# Patient Record
Sex: FEMALE | Race: WHITE | NOT HISPANIC OR LATINO | Employment: OTHER | URBAN - METROPOLITAN AREA
[De-identification: names, ages, dates, MRNs, and addresses within clinical notes are randomized per-mention and may not be internally consistent; named-entity substitution may affect disease eponyms.]

---

## 2019-06-09 PROBLEM — J18.9 PNEUMONIA OF RIGHT LOWER LOBE DUE TO INFECTIOUS ORGANISM: Status: ACTIVE | Noted: 2019-06-09

## 2019-06-09 PROBLEM — J18.9 PNEUMONIA: Status: ACTIVE | Noted: 2019-06-09

## 2019-06-13 PROBLEM — I27.21 PULMONARY ARTERIAL HYPERTENSION: Status: ACTIVE | Noted: 2019-06-13

## 2019-06-13 PROBLEM — I50.32 CHRONIC DIASTOLIC HEART FAILURE: Status: ACTIVE | Noted: 2019-06-13

## 2019-10-02 DIAGNOSIS — I10 ESSENTIAL HYPERTENSION, BENIGN: Primary | ICD-10-CM

## 2019-10-02 RX ORDER — LISINOPRIL 20 MG/1
20 TABLET ORAL DAILY
Qty: 90 TABLET | Refills: 1 | Status: SHIPPED | OUTPATIENT
Start: 2019-10-02 | End: 2019-11-25 | Stop reason: SDUPTHER

## 2019-10-02 RX ORDER — AMLODIPINE BESYLATE 5 MG/1
5 TABLET ORAL DAILY
Qty: 90 TABLET | Refills: 1 | Status: SHIPPED | OUTPATIENT
Start: 2019-10-02 | End: 2019-11-25 | Stop reason: SDUPTHER

## 2019-10-02 NOTE — TELEPHONE ENCOUNTER
----- Message from Ivette Pitt sent at 10/2/2019 10:05 AM CDT -----  Contact: Fatimah  Refill lisinopril and  Amlodipine. [facility]. pts # [phone]

## 2019-11-04 DIAGNOSIS — I10 ESSENTIAL HYPERTENSION, BENIGN: Primary | ICD-10-CM

## 2019-11-04 RX ORDER — METOPROLOL TARTRATE 25 MG/1
25 TABLET, FILM COATED ORAL 2 TIMES DAILY
Qty: 180 TABLET | Refills: 1 | Status: SHIPPED | OUTPATIENT
Start: 2019-11-04 | End: 2019-11-25 | Stop reason: SDUPTHER

## 2019-11-04 NOTE — TELEPHONE ENCOUNTER
----- Message from Margarita Escalante sent at 11/4/2019 10:11 AM CST -----  Pt needs a refill for Metoprolol Tartrate 25 mg. [facility].

## 2019-11-11 ENCOUNTER — TELEPHONE (OUTPATIENT)
Dept: FAMILY MEDICINE | Facility: CLINIC | Age: 84
End: 2019-11-11

## 2019-11-11 DIAGNOSIS — E78.5 HYPERLIPIDEMIA, UNSPECIFIED HYPERLIPIDEMIA TYPE: ICD-10-CM

## 2019-11-11 DIAGNOSIS — Z12.11 SPECIAL SCREENING FOR MALIGNANT NEOPLASMS, COLON: ICD-10-CM

## 2019-11-11 DIAGNOSIS — Z79.899 ENCOUNTER FOR LONG-TERM (CURRENT) USE OF OTHER MEDICATIONS: Primary | ICD-10-CM

## 2019-11-24 PROBLEM — D51.0 PERNICIOUS ANEMIA: Status: ACTIVE | Noted: 2019-11-24

## 2019-11-25 ENCOUNTER — OFFICE VISIT (OUTPATIENT)
Dept: FAMILY MEDICINE | Facility: CLINIC | Age: 84
End: 2019-11-25
Payer: MEDICARE

## 2019-11-25 VITALS
BODY MASS INDEX: 31.1 KG/M2 | DIASTOLIC BLOOD PRESSURE: 76 MMHG | HEIGHT: 62 IN | HEART RATE: 76 BPM | SYSTOLIC BLOOD PRESSURE: 124 MMHG | WEIGHT: 169 LBS

## 2019-11-25 DIAGNOSIS — I10 ESSENTIAL HYPERTENSION: Chronic | ICD-10-CM

## 2019-11-25 DIAGNOSIS — E78.2 MIXED HYPERLIPIDEMIA: Chronic | ICD-10-CM

## 2019-11-25 DIAGNOSIS — M15.9 PRIMARY OSTEOARTHRITIS INVOLVING MULTIPLE JOINTS: Chronic | ICD-10-CM

## 2019-11-25 DIAGNOSIS — I48.0 PAROXYSMAL ATRIAL FIBRILLATION: Primary | Chronic | ICD-10-CM

## 2019-11-25 DIAGNOSIS — I50.32 CHRONIC DIASTOLIC HEART FAILURE: ICD-10-CM

## 2019-11-25 DIAGNOSIS — M81.0 AGE-RELATED OSTEOPOROSIS WITHOUT CURRENT PATHOLOGICAL FRACTURE: ICD-10-CM

## 2019-11-25 PROBLEM — J18.9 PNEUMONIA OF RIGHT LOWER LOBE DUE TO INFECTIOUS ORGANISM: Status: RESOLVED | Noted: 2019-06-09 | Resolved: 2019-11-25

## 2019-11-25 PROBLEM — J18.9 PNEUMONIA: Status: RESOLVED | Noted: 2019-06-09 | Resolved: 2019-11-25

## 2019-11-25 PROCEDURE — 99214 OFFICE O/P EST MOD 30 MIN: CPT | Mod: S$GLB,,, | Performed by: FAMILY MEDICINE

## 2019-11-25 PROCEDURE — 99214 PR OFFICE/OUTPT VISIT, EST, LEVL IV, 30-39 MIN: ICD-10-PCS | Mod: S$GLB,,, | Performed by: FAMILY MEDICINE

## 2019-11-25 PROCEDURE — 1159F MED LIST DOCD IN RCRD: CPT | Mod: S$GLB,,, | Performed by: FAMILY MEDICINE

## 2019-11-25 PROCEDURE — 1159F PR MEDICATION LIST DOCUMENTED IN MEDICAL RECORD: ICD-10-PCS | Mod: S$GLB,,, | Performed by: FAMILY MEDICINE

## 2019-11-25 RX ORDER — SIMVASTATIN 20 MG/1
20 TABLET, FILM COATED ORAL NIGHTLY
Qty: 90 TABLET | Refills: 1 | Status: SHIPPED | OUTPATIENT
Start: 2019-11-25 | End: 2020-06-01 | Stop reason: SDUPTHER

## 2019-11-25 RX ORDER — LATANOPROST 50 UG/ML
1 SOLUTION/ DROPS OPHTHALMIC DAILY
COMMUNITY
Start: 2019-10-26

## 2019-11-25 RX ORDER — APIXABAN 5 MG/1
1 TABLET, FILM COATED ORAL 2 TIMES DAILY
COMMUNITY
Start: 2019-09-02 | End: 2020-12-14 | Stop reason: SDUPTHER

## 2019-11-25 RX ORDER — CYCLOSPORINE 0.5 MG/ML
1 EMULSION OPHTHALMIC 2 TIMES DAILY
COMMUNITY
Start: 2019-10-22

## 2019-11-25 RX ORDER — LISINOPRIL 20 MG/1
20 TABLET ORAL DAILY
Qty: 90 TABLET | Refills: 1 | Status: SHIPPED | OUTPATIENT
Start: 2019-11-25 | End: 2020-06-01 | Stop reason: SDUPTHER

## 2019-11-25 RX ORDER — IBANDRONATE SODIUM 150 MG/1
150 TABLET, FILM COATED ORAL
Qty: 3 TABLET | Refills: 1 | Status: CANCELLED | OUTPATIENT
Start: 2019-11-25

## 2019-11-25 RX ORDER — METOPROLOL TARTRATE 25 MG/1
25 TABLET, FILM COATED ORAL 2 TIMES DAILY
Qty: 180 TABLET | Refills: 1 | Status: SHIPPED | OUTPATIENT
Start: 2019-11-25 | End: 2020-06-01

## 2019-11-25 RX ORDER — AMLODIPINE BESYLATE 5 MG/1
5 TABLET ORAL DAILY
Qty: 90 TABLET | Refills: 1 | Status: SHIPPED | OUTPATIENT
Start: 2019-11-25 | End: 2020-06-01 | Stop reason: SDUPTHER

## 2019-11-25 NOTE — PROGRESS NOTES
SUBJECTIVE:    Patient ID: Fatimah Garber is a 89 y.o. female.    Chief Complaint: Follow-up (bottles brought -ac )    89 yr old female presents for routine exam. She is doing well and denies any new health concerns.   Lives alone. Does laundry and cooking, family has someone come in to clean for her. She does not drive but is active in several organizations as well as spends time going out to eat with friends.       Admission on 06/09/2019, Discharged on 06/13/2019   Component Date Value Ref Range Status    WBC 06/09/2019 7.34  3.90 - 12.70 K/uL Final    RBC 06/09/2019 3.85* 4.00 - 5.40 M/uL Final    Hemoglobin 06/09/2019 11.8* 12.0 - 16.0 g/dL Final    Hematocrit 06/09/2019 34.9* 37.0 - 48.5 % Final    Mean Corpuscular Volume 06/09/2019 91  82 - 98 fL Final    Mean Corpuscular Hemoglobin 06/09/2019 30.6  27.0 - 31.0 pg Final    Mean Corpuscular Hemoglobin Conc 06/09/2019 33.8  32.0 - 36.0 g/dL Final    RDW 06/09/2019 16.3* 11.5 - 14.5 % Final    Platelets 06/09/2019 149* 150 - 350 K/uL Final    MPV 06/09/2019 12.1  9.2 - 12.9 fL Final    Immature Granulocytes 06/09/2019 0.1  0.0 - 0.5 % Final    Gran # (ANC) 06/09/2019 5.5  1.8 - 7.7 K/uL Final    Immature Grans (Abs) 06/09/2019 0.01  0.00 - 0.04 K/uL Final    Lymph # 06/09/2019 0.8* 1.0 - 4.8 K/uL Final    Mono # 06/09/2019 0.8  0.3 - 1.0 K/uL Final    Eos # 06/09/2019 0.2  0.0 - 0.5 K/uL Final    Baso # 06/09/2019 0.05  0.00 - 0.20 K/uL Final    nRBC 06/09/2019 0  0 /100 WBC Final    Gran% 06/09/2019 74.3* 38.0 - 73.0 % Final    Lymph% 06/09/2019 10.5* 18.0 - 48.0 % Final    Mono% 06/09/2019 11.3  4.0 - 15.0 % Final    Eosinophil% 06/09/2019 3.1  0.0 - 8.0 % Final    Basophil% 06/09/2019 0.7  0.0 - 1.9 % Final    Differential Method 06/09/2019 Automated   Final    Sodium 06/09/2019 136  136 - 145 mmol/L Final    Potassium 06/09/2019 3.6  3.5 - 5.1 mmol/L Final    Chloride 06/09/2019 101  95 - 110 mmol/L Final    CO2 06/09/2019 23  22 -  31 mmol/L Final    Glucose 06/09/2019 109  70 - 110 mg/dL Final    BUN, Bld 06/09/2019 17  7 - 18 mg/dL Final    Creatinine 06/09/2019 0.66  0.50 - 1.40 mg/dL Final    Calcium 06/09/2019 9.1  8.4 - 10.2 mg/dL Final    Total Protein 06/09/2019 7.3  6.0 - 8.4 g/dL Final    Albumin 06/09/2019 4.3  3.5 - 5.2 g/dL Final    Total Bilirubin 06/09/2019 1.0  0.2 - 1.3 mg/dL Final    Alkaline Phosphatase 06/09/2019 57  38 - 145 U/L Final    AST 06/09/2019 26  14 - 36 U/L Final    ALT 06/09/2019 26  10 - 44 U/L Final    Anion Gap 06/09/2019 12  8 - 16 mmol/L Final    eGFR if African American 06/09/2019 >60  >60 mL/min/1.73 m^2 Final    eGFR if non African American 06/09/2019 >60  >60 mL/min/1.73 m^2 Final    Troponin I 06/09/2019 <0.012  0.012 - 0.034 ng/mL Final    NT-proBNP 06/09/2019 1600  5 - 1800 pg/mL Final    Blood Culture, Routine 06/09/2019 No growth after 5 days.   Final    Blood Culture, Routine 06/09/2019 No growth after 5 days.   Final    LVIDS 06/09/2019 2.78  2.1 - 4.0 cm Final    Ascending aorta 06/09/2019 2.6  cm Final    STJ 06/09/2019 2.49  cm Final    AV mean gradient 06/09/2019 12  mmHg Final    Ao peak geoffrey 06/09/2019 2.27  m/s Final    Ao VTI 06/09/2019 40.0  cm Final    IVS 06/09/2019 1.33* 0.6 - 1.1 cm Final    LA size 06/09/2019 4.6  cm Final    LVIDD 06/09/2019 4.10  3.5 - 6.0 cm Final    LVOT diameter 06/09/2019 2.0  cm Final    LVOT peak VTI 06/09/2019 15.7  cm Final    PW 06/09/2019 1.26* 0.6 - 1.1 cm Final    E wave decelartion time 06/09/2019 171  msec Final    RA Major Axis 06/09/2019 5.76  cm Final    RA Width 06/09/2019 3.33  cm Final    TR Max Geoffrey 06/09/2019 3.39  m/s Final    RVDD 06/09/2019 2.94  cm Final    LA WIDTH 06/09/2019 4.23  cm Final    PV PEAK VELOCITY 06/09/2019 122  cm/s Final    LVOT peak geoffrey 06/09/2019 97.3  m/s Final    BSA 06/09/2019 1.9  m2 Final    TDI SEPTAL 06/09/2019 0.08   Final    LV LATERAL E/E' RATIO 06/09/2019 13.44   Final     LV SEPTAL E/E' RATIO 06/09/2019 15.13   Final    TDI LATERAL 06/09/2019 0.09   Final    FS 06/09/2019 32  28 - 44 % Final    LV mass 06/09/2019 192.37  g Final    Left Ventricle Relative Wall Thick* 06/09/2019 0.61  cm Final    AV valve area 06/09/2019 1.23  cm2 Final    AV Velocity Ratio 06/09/2019 42.86   Final    AV index (prosthetic) 06/09/2019 0.39   Final    Mean e' 06/09/2019 0.09   Final    LVOT area 06/09/2019 3.14  cm2 Final    LVOT stroke volume 06/09/2019 49.30  cm3 Final    AV peak gradient 06/09/2019 20.61  mmHg Final    E/E' ratio 06/09/2019 14.24   Final    MV Peak E Geoffrey 06/09/2019 1.21  m/s Final    LV Mass Index 06/09/2019 103.4  g/m2 Final    Triscuspid Valve Regurgitation Pea* 06/09/2019 45.97  mmHg Final    LA volume 06/09/2019 106.87  cm3 Final    LA Volume Index 06/09/2019 57.5  mL/m2 Final    Left Atrium Minor Axis 06/09/2019 6.62  cm Final    Left Atrium Major Axis 06/09/2019 6.31  cm Final    MV valve area p 1/2 method 06/09/2019 3.93  cm2 Final    MV stenosis pressure 1/2 time 06/09/2019 56  ms Final    Right Atrial Pressure (from IVC) 06/09/2019 3  mmHg Final    TV rest pulmonary artery pressure 06/09/2019 49  mmHg Final    Procalcitonin 06/09/2019 <0.05  <0.25 ng/mL Final    WBC 06/10/2019 9.12  3.90 - 12.70 K/uL Final    RBC 06/10/2019 3.67* 4.00 - 5.40 M/uL Final    Hemoglobin 06/10/2019 10.9* 12.0 - 16.0 g/dL Final    Hematocrit 06/10/2019 32.7* 37.0 - 48.5 % Final    Mean Corpuscular Volume 06/10/2019 89  82 - 98 fL Final    Mean Corpuscular Hemoglobin 06/10/2019 29.7  27.0 - 31.0 pg Final    Mean Corpuscular Hemoglobin Conc 06/10/2019 33.3  32.0 - 36.0 g/dL Final    RDW 06/10/2019 15.9* 11.5 - 14.5 % Final    Platelets 06/10/2019 162  150 - 350 K/uL Final    MPV 06/10/2019 12.3  9.2 - 12.9 fL Final    Immature Granulocytes 06/10/2019 0.2  0.0 - 0.5 % Final    Gran # (ANC) 06/10/2019 8.1* 1.8 - 7.7 K/uL Final    Immature Grans (Abs) 06/10/2019  0.02  0.00 - 0.04 K/uL Final    Lymph # 06/10/2019 0.7* 1.0 - 4.8 K/uL Final    Mono # 06/10/2019 0.4  0.3 - 1.0 K/uL Final    Eos # 06/10/2019 0.0  0.0 - 0.5 K/uL Final    Baso # 06/10/2019 0.01  0.00 - 0.20 K/uL Final    nRBC 06/10/2019 0  0 /100 WBC Final    Gran% 06/10/2019 88.7* 38.0 - 73.0 % Final    Lymph% 06/10/2019 7.2* 18.0 - 48.0 % Final    Mono% 06/10/2019 3.8* 4.0 - 15.0 % Final    Eosinophil% 06/10/2019 0.0  0.0 - 8.0 % Final    Basophil% 06/10/2019 0.1  0.0 - 1.9 % Final    Differential Method 06/10/2019 Automated   Final    Sodium 06/10/2019 134* 136 - 145 mmol/L Final    Potassium 06/10/2019 3.8  3.5 - 5.1 mmol/L Final    Chloride 06/10/2019 101  95 - 110 mmol/L Final    CO2 06/10/2019 25  22 - 31 mmol/L Final    Glucose 06/10/2019 132* 70 - 110 mg/dL Final    BUN, Bld 06/10/2019 19* 7 - 18 mg/dL Final    Creatinine 06/10/2019 0.62  0.50 - 1.40 mg/dL Final    Calcium 06/10/2019 8.7  8.4 - 10.2 mg/dL Final    Anion Gap 06/10/2019 8  8 - 16 mmol/L Final    eGFR if African American 06/10/2019 >60  >60 mL/min/1.73 m^2 Final    eGFR if non African American 06/10/2019 >60  >60 mL/min/1.73 m^2 Final       Past Medical History:   Diagnosis Date    Atrial fibrillation     Hyperlipidemia     Hypertension     Osteoarthritis      Past Surgical History:   Procedure Laterality Date    HYSTERECTOMY       Family History   Problem Relation Age of Onset    Hypertension Mother     Kidney disease Mother     Heart disease Father        Marital Status:   Alcohol History:  reports that she drinks about 1.0 standard drinks of alcohol per week.  Tobacco History:  reports that she has never smoked. She has never used smokeless tobacco.  Drug History:  reports that she does not use drugs.    Review of patient's allergies indicates:  No Known Allergies    Current Outpatient Medications:     ELIQUIS 5 mg Tab, Take 1 tablet by mouth 2 (two) times daily., Disp: , Rfl:     ibandronate  (BONIVA) 150 mg tablet, Take 150 mg by mouth every 30 days., Disp: , Rfl:     lisinopril (PRINIVIL,ZESTRIL) 20 MG tablet, Take 1 tablet (20 mg total) by mouth once daily., Disp: 90 tablet, Rfl: 1    metoprolol tartrate (LOPRESSOR) 25 MG tablet, Take 1 tablet (25 mg total) by mouth 2 (two) times daily., Disp: 180 tablet, Rfl: 1    amLODIPine (NORVASC) 5 MG tablet, Take 1 tablet (5 mg total) by mouth once daily., Disp: 90 tablet, Rfl: 1    calcium carbonate/vitamin D3 (CALCIUM 600 + D,3, ORAL), Take 1 tablet by mouth once daily., Disp: , Rfl:     ipratropium-albuterol (COMBIVENT)  mcg/actuation inhaler, Inhale 1 puff into the lungs every 6 (six) hours as needed for Wheezing or Shortness of Breath. Rescue, Disp: 4 g, Rfl: 1    latanoprost 0.005 % ophthalmic solution, , Disp: , Rfl:     multivitamin-min-FA-ginkgo (ONE DAILY WOMEN 50 PLUS) 400-120 mcg-mg Tab, as directed, Disp: , Rfl:     RESTASIS 0.05 % ophthalmic emulsion, , Disp: , Rfl:     simvastatin (ZOCOR) 20 MG tablet, Take 20 mg by mouth every evening., Disp: , Rfl:     vitamin B complex (B COMPLEX-VITAMIN B12 ORAL), Take 1,000 mcg by mouth once daily., Disp: , Rfl:     vitamins  A,C,E-zinc-copper (PRESERVISION AREDS) 7,160-113-100 unit-mg-unit Tab, , Disp: , Rfl:     Review of Systems   Constitutional: Negative for appetite change, chills, fatigue, fever and unexpected weight change.   HENT: Negative for congestion, ear pain, sinus pain, sore throat and trouble swallowing.    Eyes: Negative for pain, discharge and visual disturbance.   Respiratory: Negative for apnea, cough, shortness of breath and wheezing.    Cardiovascular: Positive for leg swelling (foot and ankle swelling caroline. unable do wear compression socks. No difficulty wearing shoes.). Negative for chest pain and palpitations.   Gastrointestinal: Negative for abdominal pain, blood in stool, constipation, diarrhea, nausea and vomiting.   Endocrine: Negative for heat intolerance,  "polydipsia and polyuria.   Genitourinary: Negative for difficulty urinating, dyspareunia, dysuria, frequency, hematuria and menstrual problem.   Musculoskeletal: Positive for back pain (occasional low back pain when standing for long periods). Negative for arthralgias, gait problem, joint swelling and myalgias.   Allergic/Immunologic: Negative for environmental allergies, food allergies and immunocompromised state.   Neurological: Negative for dizziness, tremors, seizures, numbness and headaches.   Psychiatric/Behavioral: Negative for behavioral problems, confusion, hallucinations and suicidal ideas. The patient is not nervous/anxious.           Objective:      Vitals:    11/25/19 1018   BP: 124/76   Pulse: 76   Weight: 76.7 kg (169 lb)   Height: 5' 2" (1.575 m)     Body mass index is 30.91 kg/m².  Physical Exam   Constitutional: She is oriented to person, place, and time. She appears well-developed and well-nourished.   HENT:   Head: Normocephalic and atraumatic.   Right Ear: External ear normal.   Left Ear: External ear normal.   Nose: Nose normal.   Mouth/Throat: Oropharynx is clear and moist.   Eyes: Pupils are equal, round, and reactive to light. EOM are normal.   Neck: Normal range of motion. Neck supple. Carotid bruit is not present. No thyromegaly present.   Cardiovascular: Normal rate, regular rhythm, normal heart sounds and intact distal pulses.   No murmur heard.  Pulmonary/Chest: Effort normal and breath sounds normal. She has no wheezes. She has no rales.   Abdominal: Soft. Bowel sounds are normal. She exhibits no distension. There is no hepatosplenomegaly. There is no tenderness.   Musculoskeletal: Normal range of motion. She exhibits edema (1+ edema in caroline ankles and feet). She exhibits no tenderness or deformity.        Lumbar back: Normal. She exhibits no pain and no spasm.   Slow gait   Lymphadenopathy:     She has no cervical adenopathy.   Neurological: She is alert and oriented to person, place, " and time. No cranial nerve deficit. Coordination normal.   Skin: Skin is warm and dry. No rash noted.   Psychiatric: She has a normal mood and affect. Her behavior is normal. Judgment and thought content normal.   Nursing note and vitals reviewed.        Assessment:       1. Paroxysmal atrial fibrillation    2. Essential hypertension    3. Mixed hyperlipidemia    4. Primary osteoarthritis involving multiple joints    5. Age-related osteoporosis without current pathological fracture    6. Chronic diastolic heart failure         Plan:       Paroxysmal atrial fibrillation  - Stable at this time. Continue to Eliquis 5mg daily.  - Managed per Dr. Meza.    Essential hypertension  - Blood pressure at goal. Continue lisinopril 20mg daily and amlodipine 5mg daily.    Mixed hyperlipidemia  - Lipid panel to be obtained today. Continue simvastatin 20mg daily.    Primary osteoarthritis involving multiple joints  - Minimal pain complaints at this time.    Age-related osteoporosis without current pathological fracture  - Has been on ibandronate for several years now. Recommend drug holiday after she finishes her last pill in February.    Chronic diastolic heart failure  - Stable at this time. Managed per Dr. Meza.    Follow up in about 6 months (around 5/25/2020) for check up with labs.

## 2019-11-26 LAB
ALBUMIN SERPL-MCNC: 4.3 G/DL (ref 3.6–5.1)
ALBUMIN/GLOB SERPL: 1.7 (CALC) (ref 1–2.5)
ALP SERPL-CCNC: 51 U/L (ref 33–130)
ALT SERPL-CCNC: 28 U/L (ref 6–29)
AST SERPL-CCNC: 31 U/L (ref 10–35)
BILIRUB SERPL-MCNC: 0.8 MG/DL (ref 0.2–1.2)
BUN SERPL-MCNC: 19 MG/DL (ref 7–25)
BUN/CREAT SERPL: NORMAL (CALC) (ref 6–22)
CALCIUM SERPL-MCNC: 9.8 MG/DL (ref 8.6–10.4)
CHLORIDE SERPL-SCNC: 102 MMOL/L (ref 98–110)
CHOLEST SERPL-MCNC: 154 MG/DL
CHOLEST/HDLC SERPL: 2.9 (CALC)
CO2 SERPL-SCNC: 27 MMOL/L (ref 20–32)
CREAT SERPL-MCNC: 0.67 MG/DL (ref 0.6–0.88)
GFRSERPLBLD MDRD-ARVRAT: 78 ML/MIN/1.73M2
GLOBULIN SER CALC-MCNC: 2.6 G/DL (CALC) (ref 1.9–3.7)
GLUCOSE SERPL-MCNC: 92 MG/DL (ref 65–139)
HDLC SERPL-MCNC: 53 MG/DL
LDLC SERPL CALC-MCNC: 85 MG/DL (CALC)
NONHDLC SERPL-MCNC: 101 MG/DL (CALC)
POTASSIUM SERPL-SCNC: 4.3 MMOL/L (ref 3.5–5.3)
PROT SERPL-MCNC: 6.9 G/DL (ref 6.1–8.1)
SODIUM SERPL-SCNC: 138 MMOL/L (ref 135–146)
TRIGL SERPL-MCNC: 72 MG/DL

## 2019-12-10 NOTE — TELEPHONE ENCOUNTER
Please call pt and let her know labs are stable. Her blood sugar was a little high at 132 but not sure if this was a fasting lab. Kidney function is normal and cholesterol levels are well controlled- continue current medication.

## 2019-12-11 ENCOUNTER — TELEPHONE (OUTPATIENT)
Dept: FAMILY MEDICINE | Facility: CLINIC | Age: 84
End: 2019-12-11

## 2019-12-11 NOTE — TELEPHONE ENCOUNTER
Spoke to patient's daughter, Luz, because the patient was at a luncheon and could not hear me speaking to her. She will relay the message.

## 2019-12-11 NOTE — TELEPHONE ENCOUNTER
----- Message from Margarita Walls NP sent at 12/9/2019  9:21 PM CST -----  Please call pt and let her know labs are stable. Her blood sugar was a little high at 132 but not sure if this was a fasting lab. Kidney function is normal and cholesterol levels are well controlled- continue current medication.

## 2020-05-19 ENCOUNTER — TELEPHONE (OUTPATIENT)
Dept: FAMILY MEDICINE | Facility: CLINIC | Age: 85
End: 2020-05-19

## 2020-05-22 ENCOUNTER — TELEPHONE (OUTPATIENT)
Dept: FAMILY MEDICINE | Facility: CLINIC | Age: 85
End: 2020-05-22

## 2020-05-27 LAB
ALBUMIN SERPL-MCNC: 4.1 G/DL (ref 3.6–5.1)
ALBUMIN/GLOB SERPL: 1.5 (CALC) (ref 1–2.5)
ALP SERPL-CCNC: 49 U/L (ref 37–153)
ALT SERPL-CCNC: 24 U/L (ref 6–29)
AST SERPL-CCNC: 20 U/L (ref 10–35)
BASOPHILS # BLD AUTO: 59 CELLS/UL (ref 0–200)
BASOPHILS NFR BLD AUTO: 0.9 %
BILIRUB SERPL-MCNC: 1 MG/DL (ref 0.2–1.2)
BUN SERPL-MCNC: 19 MG/DL (ref 7–25)
BUN/CREAT SERPL: NORMAL (CALC) (ref 6–22)
CALCIUM SERPL-MCNC: 9.4 MG/DL (ref 8.6–10.4)
CHLORIDE SERPL-SCNC: 105 MMOL/L (ref 98–110)
CHOLEST SERPL-MCNC: 157 MG/DL
CHOLEST/HDLC SERPL: 2.8 (CALC)
CO2 SERPL-SCNC: 27 MMOL/L (ref 20–32)
CREAT SERPL-MCNC: 0.7 MG/DL (ref 0.6–0.88)
EOSINOPHIL # BLD AUTO: 363 CELLS/UL (ref 15–500)
EOSINOPHIL NFR BLD AUTO: 5.5 %
ERYTHROCYTE [DISTWIDTH] IN BLOOD BY AUTOMATED COUNT: 15.1 % (ref 11–15)
GFRSERPLBLD MDRD-ARVRAT: 77 ML/MIN/1.73M2
GLOBULIN SER CALC-MCNC: 2.7 G/DL (CALC) (ref 1.9–3.7)
GLUCOSE SERPL-MCNC: 90 MG/DL (ref 65–99)
HCT VFR BLD AUTO: 40.4 % (ref 35–45)
HDLC SERPL-MCNC: 56 MG/DL
HGB BLD-MCNC: 12.7 G/DL (ref 11.7–15.5)
LDLC SERPL CALC-MCNC: 82 MG/DL (CALC)
LYMPHOCYTES # BLD AUTO: 1287 CELLS/UL (ref 850–3900)
LYMPHOCYTES NFR BLD AUTO: 19.5 %
MCH RBC QN AUTO: 29.6 PG (ref 27–33)
MCHC RBC AUTO-ENTMCNC: 31.4 G/DL (ref 32–36)
MCV RBC AUTO: 94.2 FL (ref 80–100)
MONOCYTES # BLD AUTO: 719 CELLS/UL (ref 200–950)
MONOCYTES NFR BLD AUTO: 10.9 %
NEUTROPHILS # BLD AUTO: 4171 CELLS/UL (ref 1500–7800)
NEUTROPHILS NFR BLD AUTO: 63.2 %
NONHDLC SERPL-MCNC: 101 MG/DL (CALC)
PLATELET # BLD AUTO: 150 THOUSAND/UL (ref 140–400)
PMV BLD REES-ECKER: 12.2 FL (ref 7.5–12.5)
POTASSIUM SERPL-SCNC: 4.2 MMOL/L (ref 3.5–5.3)
PROT SERPL-MCNC: 6.8 G/DL (ref 6.1–8.1)
RBC # BLD AUTO: 4.29 MILLION/UL (ref 3.8–5.1)
SODIUM SERPL-SCNC: 140 MMOL/L (ref 135–146)
TRIGL SERPL-MCNC: 95 MG/DL
TSH SERPL-ACNC: 4.12 MIU/L (ref 0.4–4.5)
WBC # BLD AUTO: 6.6 THOUSAND/UL (ref 3.8–10.8)

## 2020-06-01 ENCOUNTER — OFFICE VISIT (OUTPATIENT)
Dept: FAMILY MEDICINE | Facility: CLINIC | Age: 85
End: 2020-06-01
Payer: MEDICARE

## 2020-06-01 VITALS — DIASTOLIC BLOOD PRESSURE: 86 MMHG | HEART RATE: 92 BPM | SYSTOLIC BLOOD PRESSURE: 138 MMHG | TEMPERATURE: 98 F

## 2020-06-01 DIAGNOSIS — I10 ESSENTIAL HYPERTENSION: Chronic | ICD-10-CM

## 2020-06-01 DIAGNOSIS — I27.21 PULMONARY ARTERIAL HYPERTENSION: ICD-10-CM

## 2020-06-01 DIAGNOSIS — I50.32 CHRONIC DIASTOLIC HEART FAILURE: ICD-10-CM

## 2020-06-01 DIAGNOSIS — M15.9 PRIMARY OSTEOARTHRITIS INVOLVING MULTIPLE JOINTS: Chronic | ICD-10-CM

## 2020-06-01 DIAGNOSIS — E78.2 MIXED HYPERLIPIDEMIA: Chronic | ICD-10-CM

## 2020-06-01 DIAGNOSIS — I48.0 PAROXYSMAL ATRIAL FIBRILLATION: Primary | ICD-10-CM

## 2020-06-01 DIAGNOSIS — M81.0 AGE-RELATED OSTEOPOROSIS WITHOUT CURRENT PATHOLOGICAL FRACTURE: ICD-10-CM

## 2020-06-01 PROCEDURE — 99214 OFFICE O/P EST MOD 30 MIN: CPT | Mod: S$GLB,,, | Performed by: FAMILY MEDICINE

## 2020-06-01 PROCEDURE — 99214 PR OFFICE/OUTPT VISIT, EST, LEVL IV, 30-39 MIN: ICD-10-PCS | Mod: S$GLB,,, | Performed by: FAMILY MEDICINE

## 2020-06-01 RX ORDER — METOPROLOL TARTRATE 25 MG/1
25 TABLET, FILM COATED ORAL 2 TIMES DAILY
Qty: 180 TABLET | Refills: 1 | Status: SHIPPED | OUTPATIENT
Start: 2020-06-01 | End: 2020-12-14 | Stop reason: SDUPTHER

## 2020-06-01 RX ORDER — METOPROLOL TARTRATE 25 MG/1
1 TABLET, FILM COATED ORAL 2 TIMES DAILY
COMMUNITY
Start: 2020-04-09 | End: 2020-06-01 | Stop reason: SDUPTHER

## 2020-06-01 RX ORDER — SIMVASTATIN 20 MG/1
20 TABLET, FILM COATED ORAL NIGHTLY
Qty: 90 TABLET | Refills: 1 | Status: SHIPPED | OUTPATIENT
Start: 2020-06-01 | End: 2020-12-09 | Stop reason: SDUPTHER

## 2020-06-01 RX ORDER — LISINOPRIL 20 MG/1
20 TABLET ORAL DAILY
Qty: 90 TABLET | Refills: 1 | Status: SHIPPED | OUTPATIENT
Start: 2020-06-01 | End: 2020-12-09 | Stop reason: SDUPTHER

## 2020-06-01 RX ORDER — AMLODIPINE BESYLATE 5 MG/1
5 TABLET ORAL DAILY
Qty: 90 TABLET | Refills: 1 | Status: SHIPPED | OUTPATIENT
Start: 2020-06-01 | End: 2020-12-09 | Stop reason: SDUPTHER

## 2020-06-01 NOTE — PROGRESS NOTES
SUBJECTIVE:    Patient ID: Fatimah Garber is a 89 y.o. female.    Chief Complaint: No chief complaint on file.    This 89-year-old female comes in for his six-month checkup.  She has been self isolating at home and to the COVID-19 virus.  She does wear surgical mass to the grocery store and when she goes to [employer].  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  But she does laundry and dishes and light housework.  She likes to take naps during the day.    She has chronic atrial fibrillation managed by Dr. Meza.  She is on Eliquis and metoprolol.  She denies any shortness of breath or upper respiratory symptoms.  She denies any GI problems or hematuria.  She does have nocturia 3 times per night.      No visits with results within 6 Month(s) from this visit.   Latest known visit with results is:   Office Visit on 11/25/2019   Component Date Value Ref Range Status    WBC 05/26/2020 6.6  3.8 - 10.8 Thousand/uL Final    RBC 05/26/2020 4.29  3.80 - 5.10 Million/uL Final    Hemoglobin 05/26/2020 12.7  11.7 - 15.5 g/dL Final    Hematocrit 05/26/2020 40.4  35.0 - 45.0 % Final    Mean Corpuscular Volume 05/26/2020 94.2  80.0 - 100.0 fL Final    Mean Corpuscular Hemoglobin 05/26/2020 29.6  27.0 - 33.0 pg Final    Mean Corpuscular Hemoglobin Conc 05/26/2020 31.4* 32.0 - 36.0 g/dL Final    RDW 05/26/2020 15.1* 11.0 - 15.0 % Final    Platelets 05/26/2020 150  140 - 400 Thousand/uL Final    MPV 05/26/2020 12.2  7.5 - 12.5 fL Final    Neutrophils Absolute 05/26/2020 4,171  1,500 - 7,800 cells/uL Final    Lymph # 05/26/2020 1,287  850 - 3,900 cells/uL Final    Mono # 05/26/2020 719  200 - 950 cells/uL Final    Eos # 05/26/2020 363  15 - 500 cells/uL Final    Baso # 05/26/2020 59  0 - 200 cells/uL Final    Neutrophils Relative 05/26/2020 63.2  % Final    Lymph% 05/26/2020 19.5  % Final    Mono% 05/26/2020 10.9  % Final    Eosinophil% 05/26/2020 5.5  % Final    Basophil% 05/26/2020 0.9   % Final    Glucose 05/26/2020 90  65 - 99 mg/dL Final    BUN, Bld 05/26/2020 19  7 - 25 mg/dL Final    Creatinine 05/26/2020 0.70  0.60 - 0.88 mg/dL Final    eGFR if non African American 05/26/2020 77  > OR = 60 mL/min/1.73m2 Final    eGFR if African American 05/26/2020 89  > OR = 60 mL/min/1.73m2 Final    BUN/Creatinine Ratio 05/26/2020 NOT APPLICABLE  6 - 22 (calc) Final    Sodium 05/26/2020 140  135 - 146 mmol/L Final    Potassium 05/26/2020 4.2  3.5 - 5.3 mmol/L Final    Chloride 05/26/2020 105  98 - 110 mmol/L Final    CO2 05/26/2020 27  20 - 32 mmol/L Final    Calcium 05/26/2020 9.4  8.6 - 10.4 mg/dL Final    Total Protein 05/26/2020 6.8  6.1 - 8.1 g/dL Final    Albumin 05/26/2020 4.1  3.6 - 5.1 g/dL Final    Globulin, Total 05/26/2020 2.7  1.9 - 3.7 g/dL (calc) Final    Albumin/Globulin Ratio 05/26/2020 1.5  1.0 - 2.5 (calc) Final    Total Bilirubin 05/26/2020 1.0  0.2 - 1.2 mg/dL Final    Alkaline Phosphatase 05/26/2020 49  37 - 153 U/L Final    AST 05/26/2020 20  10 - 35 U/L Final    ALT 05/26/2020 24  6 - 29 U/L Final    Cholesterol 05/26/2020 157  <200 mg/dL Final    HDL 05/26/2020 56  > OR = 50 mg/dL Final    Triglycerides 05/26/2020 95  <150 mg/dL Final    LDL Cholesterol 05/26/2020 82  mg/dL (calc) Final    Hdl/Cholesterol Ratio 05/26/2020 2.8  <5.0 (calc) Final    Non HDL Chol. (LDL+VLDL) 05/26/2020 101  <130 mg/dL (calc) Final    TSH 05/26/2020 4.12  0.40 - 4.50 mIU/L Final       Past Medical History:   Diagnosis Date    Atrial fibrillation     Hyperlipidemia     Hypertension     Osteoarthritis      Past Surgical History:   Procedure Laterality Date    HYSTERECTOMY       Family History   Problem Relation Age of Onset    Hypertension Mother     Kidney disease Mother     Heart disease Father        Marital Status:   Alcohol History:  reports that she drinks about 1.0 standard drinks of alcohol per week.  Tobacco History:  reports that she has never smoked. She  has never used smokeless tobacco.  Drug History:  reports that she does not use drugs.    Review of patient's allergies indicates:  No Known Allergies    Current Outpatient Medications:     amLODIPine (NORVASC) 5 MG tablet, Take 1 tablet (5 mg total) by mouth once daily., Disp: 90 tablet, Rfl: 1    calcium carbonate/vitamin D3 (CALCIUM 600 + D,3, ORAL), Take 1 tablet by mouth once daily., Disp: , Rfl:     ELIQUIS 5 mg Tab, Take 1 tablet by mouth 2 (two) times daily., Disp: , Rfl:     ibandronate (BONIVA) 150 mg tablet, Take 150 mg by mouth every 30 days., Disp: , Rfl:     ipratropium-albuterol (COMBIVENT)  mcg/actuation inhaler, Inhale 1 puff into the lungs every 6 (six) hours as needed for Wheezing or Shortness of Breath. Rescue, Disp: 4 g, Rfl: 1    latanoprost 0.005 % ophthalmic solution, , Disp: , Rfl:     lisinopril (PRINIVIL,ZESTRIL) 20 MG tablet, Take 1 tablet (20 mg total) by mouth once daily., Disp: 90 tablet, Rfl: 1    metoprolol tartrate (LOPRESSOR) 25 MG tablet, Take 1 tablet (25 mg total) by mouth 2 (two) times daily., Disp: 180 tablet, Rfl: 1    multivitamin-min-FA-ginkgo (ONE DAILY WOMEN 50 PLUS) 400-120 mcg-mg Tab, as directed, Disp: , Rfl:     RESTASIS 0.05 % ophthalmic emulsion, , Disp: , Rfl:     simvastatin (ZOCOR) 20 MG tablet, Take 1 tablet (20 mg total) by mouth every evening., Disp: 90 tablet, Rfl: 1    vitamin B complex (B COMPLEX-VITAMIN B12 ORAL), Take 1,000 mcg by mouth once daily., Disp: , Rfl:     vitamins  A,C,E-zinc-copper (PRESERVISION AREDS) 7,160-113-100 unit-mg-unit Tab, , Disp: , Rfl:     Review of Systems   Constitutional: Negative for appetite change, chills, fatigue, fever and unexpected weight change.   HENT: Negative for congestion, ear pain, sinus pain, sore throat and trouble swallowing.         No upper respiratory sinus infections lately.   Eyes: Negative for pain, discharge and visual disturbance.   Respiratory: Negative for apnea, cough, shortness of  breath and wheezing.    Cardiovascular: Negative for chest pain, palpitations and leg swelling (1+ edema Beth resolves every morning).        Chronic AFib currently on Eliquis b.i.d.   Gastrointestinal: Negative for abdominal pain, blood in stool, constipation, diarrhea, nausea and vomiting.   Endocrine: Negative for heat intolerance, polydipsia and polyuria.   Genitourinary: Negative for difficulty urinating, dyspareunia, dysuria, frequency, hematuria and menstrual problem.        Nocturia 3 times per night   Musculoskeletal: Negative for arthralgias (Shoulders ache from time to time but she takes Tylenol p.r.n.), back pain, gait problem, joint swelling and myalgias.   Allergic/Immunologic: Negative for environmental allergies, food allergies and immunocompromised state.   Neurological: Negative for dizziness, tremors, seizures, numbness and headaches.   Psychiatric/Behavioral: Negative for behavioral problems, confusion, hallucinations and suicidal ideas. The patient is not nervous/anxious.           Objective:      Vitals:    06/01/20 1131   BP: 138/86   Pulse: 92   Temp: 97.6 °F (36.4 °C)     There is no height or weight on file to calculate BMI.  Physical Exam   Constitutional: She is oriented to person, place, and time. She appears well-developed and well-nourished.   HENT:   Head: Normocephalic and atraumatic.   Right Ear: External ear normal.   Left Ear: External ear normal.   Nose: Nose normal.   Mouth/Throat: Oropharynx is clear and moist.   Eyes: Pupils are equal, round, and reactive to light. EOM are normal.   Neck: Normal range of motion. Neck supple. Carotid bruit is not present. No thyromegaly present.   Cardiovascular: Normal rate, normal heart sounds and intact distal pulses.   No murmur heard.  Irregular rate and rhythm compatible with AFib   Pulmonary/Chest: Effort normal and breath sounds normal. She has no wheezes. She has no rales.   Abdominal: Soft. Bowel sounds are normal. She exhibits no  distension. There is no hepatosplenomegaly. There is no tenderness.   Musculoskeletal: Normal range of motion. She exhibits edema (1+ pitting edema to both ankles with some stasis fibrosis also.  Dried stasis dermatitis is present). She exhibits no tenderness or deformity.        Lumbar back: Normal. She exhibits no pain and no spasm.   Bends 90 degrees at  waist   Lymphadenopathy:     She has no cervical adenopathy.   Neurological: She is alert and oriented to person, place, and time. No cranial nerve deficit. Coordination normal.   Skin: Skin is warm and dry. No rash noted.   Dry scaly skin on lower legs.   Psychiatric: She has a normal mood and affect. Her behavior is normal. Judgment and thought content normal.   Nursing note and vitals reviewed.        Assessment:       1. Paroxysmal atrial fibrillation    2. Chronic diastolic heart failure    3. Pulmonary arterial hypertension    4. Mixed hyperlipidemia    5. Primary osteoarthritis involving multiple joints    6. Age-related osteoporosis without current pathological fracture    7. Essential hypertension         Plan:       Paroxysmal atrial fibrillation  Patient is in AFib today and managed well with Eliquis and metoprolol.  See Dr. Meza as scheduled this week.  Chronic diastolic heart failure  Compensated well  Pulmonary arterial hypertension  Not hypoxic  Mixed hyperlipidemia  Cholesterol at goal on current regimen.  Copy of labs given to patient to bring to Dr. Meza  Primary osteoarthritis involving multiple joints  Managing well with Tylenol p.r.n.  Age-related osteoporosis without current pathological fracture  Continue calcium plus vitamin-D  Essential hypertension  Blood pressure doing well on current medications.    No follow-ups on file.

## 2020-10-19 ENCOUNTER — CLINICAL SUPPORT (OUTPATIENT)
Dept: FAMILY MEDICINE | Facility: CLINIC | Age: 85
End: 2020-10-19
Payer: MEDICARE

## 2020-10-19 DIAGNOSIS — Z23 NEED FOR INFLUENZA VACCINATION: Primary | ICD-10-CM

## 2020-10-19 PROCEDURE — G0008 ADMIN INFLUENZA VIRUS VAC: HCPCS | Mod: S$GLB,,, | Performed by: NURSE PRACTITIONER

## 2020-10-19 PROCEDURE — 90662 IIV NO PRSV INCREASED AG IM: CPT | Mod: S$GLB,,, | Performed by: NURSE PRACTITIONER

## 2020-10-19 PROCEDURE — G0008 FLU VACCINE - QUADRIVALENT - HIGH DOSE (65+) PRESERVATIVE FREE IM: ICD-10-PCS | Mod: S$GLB,,, | Performed by: NURSE PRACTITIONER

## 2020-10-19 PROCEDURE — 90662 FLU VACCINE - QUADRIVALENT - HIGH DOSE (65+) PRESERVATIVE FREE IM: ICD-10-PCS | Mod: S$GLB,,, | Performed by: NURSE PRACTITIONER

## 2020-10-28 ENCOUNTER — PES CALL (OUTPATIENT)
Dept: ADMINISTRATIVE | Facility: CLINIC | Age: 85
End: 2020-10-28

## 2020-11-10 ENCOUNTER — TELEPHONE (OUTPATIENT)
Dept: FAMILY MEDICINE | Facility: CLINIC | Age: 85
End: 2020-11-10

## 2020-11-10 NOTE — TELEPHONE ENCOUNTER
----- Message from Jacqui Ríos sent at 11/10/2020 10:30 AM CST -----  Regarding: Reschedule appt  Contact: Fatimah Garber Pt would like to r/s her appt that she has 12/07 w/ Dr. Mancera to the following week 12/14 or 12/21 or 12/28. She says the appt has to be on a Monday because that's when she's able to get transportation . Please give the patient a call back #[phone] or [phone]

## 2020-12-04 ENCOUNTER — TELEPHONE (OUTPATIENT)
Dept: FAMILY MEDICINE | Facility: CLINIC | Age: 85
End: 2020-12-04

## 2020-12-07 ENCOUNTER — TELEPHONE (OUTPATIENT)
Dept: FAMILY MEDICINE | Facility: CLINIC | Age: 85
End: 2020-12-07

## 2020-12-09 DIAGNOSIS — I10 ESSENTIAL HYPERTENSION: Chronic | ICD-10-CM

## 2020-12-09 DIAGNOSIS — E78.2 MIXED HYPERLIPIDEMIA: Chronic | ICD-10-CM

## 2020-12-09 LAB
ALBUMIN SERPL-MCNC: 4 G/DL (ref 3.6–5.1)
ALBUMIN/GLOB SERPL: 1.4 (CALC) (ref 1–2.5)
ALP SERPL-CCNC: 51 U/L (ref 37–153)
ALT SERPL-CCNC: 16 U/L (ref 6–29)
AST SERPL-CCNC: 19 U/L (ref 10–35)
BASOPHILS # BLD AUTO: 68 CELLS/UL (ref 0–200)
BASOPHILS NFR BLD AUTO: 0.9 %
BILIRUB SERPL-MCNC: 1 MG/DL (ref 0.2–1.2)
BUN SERPL-MCNC: 22 MG/DL (ref 7–25)
BUN/CREAT SERPL: NORMAL (CALC) (ref 6–22)
CALCIUM SERPL-MCNC: 9.4 MG/DL (ref 8.6–10.4)
CHLORIDE SERPL-SCNC: 104 MMOL/L (ref 98–110)
CHOLEST SERPL-MCNC: 139 MG/DL
CHOLEST/HDLC SERPL: 2.4 (CALC)
CO2 SERPL-SCNC: 28 MMOL/L (ref 20–32)
CREAT SERPL-MCNC: 0.8 MG/DL (ref 0.6–0.88)
EOSINOPHIL # BLD AUTO: 308 CELLS/UL (ref 15–500)
EOSINOPHIL NFR BLD AUTO: 4.1 %
ERYTHROCYTE [DISTWIDTH] IN BLOOD BY AUTOMATED COUNT: 14.2 % (ref 11–15)
GFRSERPLBLD MDRD-ARVRAT: 65 ML/MIN/1.73M2
GLOBULIN SER CALC-MCNC: 2.9 G/DL (CALC) (ref 1.9–3.7)
GLUCOSE SERPL-MCNC: 92 MG/DL (ref 65–99)
HCT VFR BLD AUTO: 37.3 % (ref 35–45)
HDLC SERPL-MCNC: 59 MG/DL
HGB BLD-MCNC: 12.2 G/DL (ref 11.7–15.5)
LDLC SERPL CALC-MCNC: 65 MG/DL (CALC)
LYMPHOCYTES # BLD AUTO: 1380 CELLS/UL (ref 850–3900)
LYMPHOCYTES NFR BLD AUTO: 18.4 %
MCH RBC QN AUTO: 31.2 PG (ref 27–33)
MCHC RBC AUTO-ENTMCNC: 32.7 G/DL (ref 32–36)
MCV RBC AUTO: 95.4 FL (ref 80–100)
MONOCYTES # BLD AUTO: 788 CELLS/UL (ref 200–950)
MONOCYTES NFR BLD AUTO: 10.5 %
NEUTROPHILS # BLD AUTO: 4958 CELLS/UL (ref 1500–7800)
NEUTROPHILS NFR BLD AUTO: 66.1 %
NONHDLC SERPL-MCNC: 80 MG/DL (CALC)
PLATELET # BLD AUTO: 167 THOUSAND/UL (ref 140–400)
PMV BLD REES-ECKER: 12.6 FL (ref 7.5–12.5)
POTASSIUM SERPL-SCNC: 4 MMOL/L (ref 3.5–5.3)
PROT SERPL-MCNC: 6.9 G/DL (ref 6.1–8.1)
RBC # BLD AUTO: 3.91 MILLION/UL (ref 3.8–5.1)
SODIUM SERPL-SCNC: 140 MMOL/L (ref 135–146)
TRIGL SERPL-MCNC: 73 MG/DL
WBC # BLD AUTO: 7.5 THOUSAND/UL (ref 3.8–10.8)

## 2020-12-09 RX ORDER — LISINOPRIL 20 MG/1
20 TABLET ORAL DAILY
Qty: 90 TABLET | Refills: 1 | Status: SHIPPED | OUTPATIENT
Start: 2020-12-09 | End: 2020-12-14 | Stop reason: SDUPTHER

## 2020-12-09 RX ORDER — AMLODIPINE BESYLATE 5 MG/1
5 TABLET ORAL DAILY
Qty: 90 TABLET | Refills: 1 | Status: SHIPPED | OUTPATIENT
Start: 2020-12-09 | End: 2020-12-14 | Stop reason: SDUPTHER

## 2020-12-09 RX ORDER — SIMVASTATIN 20 MG/1
20 TABLET, FILM COATED ORAL NIGHTLY
Qty: 90 TABLET | Refills: 1 | Status: SHIPPED | OUTPATIENT
Start: 2020-12-09 | End: 2020-12-14 | Stop reason: SDUPTHER

## 2020-12-09 NOTE — TELEPHONE ENCOUNTER
----- Message from Jacqui Ríos sent at 12/9/2020  8:59 AM CST -----  Regarding: Refill  Contact: Fatimah Garber  Needs refill on 90 day supply on Amlodipine 5mg, Lisinopril 20mg, and Simvastatin 20mg   [facility]  Pt# [phone]

## 2020-12-14 ENCOUNTER — OFFICE VISIT (OUTPATIENT)
Dept: FAMILY MEDICINE | Facility: CLINIC | Age: 85
End: 2020-12-14
Payer: MEDICARE

## 2020-12-14 ENCOUNTER — TELEPHONE (OUTPATIENT)
Dept: FAMILY MEDICINE | Facility: CLINIC | Age: 85
End: 2020-12-14

## 2020-12-14 VITALS
DIASTOLIC BLOOD PRESSURE: 80 MMHG | BODY MASS INDEX: 30.91 KG/M2 | SYSTOLIC BLOOD PRESSURE: 142 MMHG | HEIGHT: 62 IN | HEART RATE: 80 BPM | WEIGHT: 168 LBS

## 2020-12-14 DIAGNOSIS — I10 ESSENTIAL HYPERTENSION: Chronic | ICD-10-CM

## 2020-12-14 DIAGNOSIS — E78.2 MIXED HYPERLIPIDEMIA: Chronic | ICD-10-CM

## 2020-12-14 DIAGNOSIS — J40 BRONCHITIS: ICD-10-CM

## 2020-12-14 DIAGNOSIS — I48.0 PAROXYSMAL ATRIAL FIBRILLATION: Primary | ICD-10-CM

## 2020-12-14 PROCEDURE — 99214 PR OFFICE/OUTPT VISIT, EST, LEVL IV, 30-39 MIN: ICD-10-PCS | Mod: S$GLB,,, | Performed by: NURSE PRACTITIONER

## 2020-12-14 PROCEDURE — 99214 OFFICE O/P EST MOD 30 MIN: CPT | Mod: S$GLB,,, | Performed by: NURSE PRACTITIONER

## 2020-12-14 RX ORDER — AZITHROMYCIN 250 MG/1
TABLET, FILM COATED ORAL
Qty: 6 TABLET | Refills: 0 | Status: SHIPPED | OUTPATIENT
Start: 2020-12-14 | End: 2020-12-22 | Stop reason: ALTCHOICE

## 2020-12-14 RX ORDER — SIMVASTATIN 20 MG/1
20 TABLET, FILM COATED ORAL NIGHTLY
Qty: 90 TABLET | Refills: 1 | Status: SHIPPED | OUTPATIENT
Start: 2020-12-14 | End: 2021-06-08 | Stop reason: SDUPTHER

## 2020-12-14 RX ORDER — APIXABAN 5 MG/1
5 TABLET, FILM COATED ORAL 2 TIMES DAILY
Qty: 180 TABLET | Refills: 1 | Status: SHIPPED | OUTPATIENT
Start: 2020-12-14 | End: 2021-12-27 | Stop reason: SDUPTHER

## 2020-12-14 RX ORDER — AMLODIPINE BESYLATE 5 MG/1
5 TABLET ORAL DAILY
Qty: 90 TABLET | Refills: 1 | Status: SHIPPED | OUTPATIENT
Start: 2020-12-14 | End: 2021-06-08 | Stop reason: SDUPTHER

## 2020-12-14 RX ORDER — LISINOPRIL 20 MG/1
20 TABLET ORAL DAILY
Qty: 90 TABLET | Refills: 1 | Status: SHIPPED | OUTPATIENT
Start: 2020-12-14 | End: 2021-06-08 | Stop reason: SDUPTHER

## 2020-12-14 RX ORDER — AZITHROMYCIN 250 MG/1
TABLET, FILM COATED ORAL
Qty: 6 TABLET | Refills: 0 | Status: SHIPPED | OUTPATIENT
Start: 2020-12-14 | End: 2020-12-14

## 2020-12-14 RX ORDER — METOPROLOL TARTRATE 25 MG/1
25 TABLET, FILM COATED ORAL 2 TIMES DAILY
Qty: 180 TABLET | Refills: 1 | Status: SHIPPED | OUTPATIENT
Start: 2020-12-14 | End: 2021-06-21 | Stop reason: SDUPTHER

## 2020-12-14 NOTE — TELEPHONE ENCOUNTER
Spoke with pt daughter to let her know that per  he read over [facility] note and her exposure is low. Doesn't feel she needs to be tested.   Daughter says shes very social for 89 goes out quite a bit.   Per Evita she put her on a zpack bc of the slight wheezing and her age. Doesn't recommend pt be tested.

## 2020-12-14 NOTE — TELEPHONE ENCOUNTER
----- Message from Vidhi Mckeon sent at 12/14/2020  2:22 PM CST -----  - pt daughter is calling about the wheezing. She is concerned she did not get a covid test done   Ray samira [phone]

## 2020-12-14 NOTE — PROGRESS NOTES
SUBJECTIVE:    Patient ID: Fatimah Garber is a 89 y.o. female.    Chief Complaint: Cough (brought bottles, refill needed-DJ)    Pt presents for 6 month follow up. States she is doing wel.. Still lives alone. No trouble keeping her house. No longer driving. C/o mild cough for the last 4 days. Somewhat productive. No other complaints. Recent blood work looks good. Will see Dr. Meza next month.       No visits with results within 6 Month(s) from this visit.   Latest known visit with results is:   Office Visit on 06/01/2020   Component Date Value Ref Range Status    WBC 12/08/2020 7.5  3.8 - 10.8 Thousand/uL Final    RBC 12/08/2020 3.91  3.80 - 5.10 Million/uL Final    Hemoglobin 12/08/2020 12.2  11.7 - 15.5 g/dL Final    Hematocrit 12/08/2020 37.3  35.0 - 45.0 % Final    MCV 12/08/2020 95.4  80.0 - 100.0 fL Final    MCH 12/08/2020 31.2  27.0 - 33.0 pg Final    MCHC 12/08/2020 32.7  32.0 - 36.0 g/dL Final    RDW 12/08/2020 14.2  11.0 - 15.0 % Final    Platelets 12/08/2020 167  140 - 400 Thousand/uL Final    MPV 12/08/2020 12.6* 7.5 - 12.5 fL Final    Neutrophils Absolute 12/08/2020 4,958  1,500 - 7,800 cells/uL Final    Lymph # 12/08/2020 1,380  850 - 3,900 cells/uL Final    Mono # 12/08/2020 788  200 - 950 cells/uL Final    Eos # 12/08/2020 308  15 - 500 cells/uL Final    Baso # 12/08/2020 68  0 - 200 cells/uL Final    Neutrophils Relative 12/08/2020 66.1  % Final    Lymph % 12/08/2020 18.4  % Final    Mono % 12/08/2020 10.5  % Final    Eosinophil % 12/08/2020 4.1  % Final    Basophil % 12/08/2020 0.9  % Final    Glucose 12/08/2020 92  65 - 99 mg/dL Final    BUN 12/08/2020 22  7 - 25 mg/dL Final    Creatinine 12/08/2020 0.80  0.60 - 0.88 mg/dL Final    eGFR if non African American 12/08/2020 65  > OR = 60 mL/min/1.73m2 Final    eGFR if African American 12/08/2020 75  > OR = 60 mL/min/1.73m2 Final    BUN/Creatinine Ratio 12/08/2020 NOT APPLICABLE  6 - 22 (calc) Final    Sodium 12/08/2020  140  135 - 146 mmol/L Final    Potassium 12/08/2020 4.0  3.5 - 5.3 mmol/L Final    Chloride 12/08/2020 104  98 - 110 mmol/L Final    CO2 12/08/2020 28  20 - 32 mmol/L Final    Calcium 12/08/2020 9.4  8.6 - 10.4 mg/dL Final    Total Protein 12/08/2020 6.9  6.1 - 8.1 g/dL Final    Albumin 12/08/2020 4.0  3.6 - 5.1 g/dL Final    Globulin, Total 12/08/2020 2.9  1.9 - 3.7 g/dL (calc) Final    Albumin/Globulin Ratio 12/08/2020 1.4  1.0 - 2.5 (calc) Final    Total Bilirubin 12/08/2020 1.0  0.2 - 1.2 mg/dL Final    Alkaline Phosphatase 12/08/2020 51  37 - 153 U/L Final    AST 12/08/2020 19  10 - 35 U/L Final    ALT 12/08/2020 16  6 - 29 U/L Final    Cholesterol 12/08/2020 139  <200 mg/dL Final    HDL 12/08/2020 59  > OR = 50 mg/dL Final    Triglycerides 12/08/2020 73  <150 mg/dL Final    LDL Cholesterol 12/08/2020 65  mg/dL (calc) Final    HDL/Cholesterol Ratio 12/08/2020 2.4  <5.0 (calc) Final    Non HDL Chol. (LDL+VLDL) 12/08/2020 80  <130 mg/dL (calc) Final       Past Medical History:   Diagnosis Date    Atrial fibrillation     Hyperlipidemia     Hypertension     Osteoarthritis      Past Surgical History:   Procedure Laterality Date    HYSTERECTOMY       Family History   Problem Relation Age of Onset    Hypertension Mother     Kidney disease Mother     Heart disease Father        Marital Status:   Alcohol History:  reports current alcohol use of about 1.0 standard drinks of alcohol per week.  Tobacco History:  reports that she has never smoked. She has never used smokeless tobacco.  Drug History:  reports no history of drug use.    Review of patient's allergies indicates:  No Known Allergies    Current Outpatient Medications:     amLODIPine (NORVASC) 5 MG tablet, Take 1 tablet (5 mg total) by mouth once daily., Disp: 90 tablet, Rfl: 1    calcium carbonate/vitamin D3 (CALCIUM 600 + D,3, ORAL), Take 1 tablet by mouth once daily., Disp: , Rfl:     ELIQUIS 5 mg Tab, Take 1 tablet (5 mg  total) by mouth 2 (two) times daily., Disp: 180 tablet, Rfl: 1    latanoprost 0.005 % ophthalmic solution, , Disp: , Rfl:     metoprolol tartrate (LOPRESSOR) 25 MG tablet, Take 1 tablet (25 mg total) by mouth 2 (two) times a day., Disp: 180 tablet, Rfl: 1    multivitamin-min-FA-ginkgo (ONE DAILY WOMEN 50 PLUS) 400-120 mcg-mg Tab, as directed, Disp: , Rfl:     RESTASIS 0.05 % ophthalmic emulsion, , Disp: , Rfl:     simvastatin (ZOCOR) 20 MG tablet, Take 1 tablet (20 mg total) by mouth every evening., Disp: 90 tablet, Rfl: 1    vitamin B complex (B COMPLEX-VITAMIN B12 ORAL), Take 1,000 mcg by mouth once daily., Disp: , Rfl:     azithromycin (Z-SONIYA) 250 MG tablet, Take 2 tablets by mouth on day 1; Take 1 tablet by mouth on days 2-5, Disp: 6 tablet, Rfl: 0    ibandronate (BONIVA) 150 mg tablet, Take 150 mg by mouth every 30 days., Disp: , Rfl:     ipratropium-albuterol (COMBIVENT)  mcg/actuation inhaler, Inhale 1 puff into the lungs every 6 (six) hours as needed for Wheezing or Shortness of Breath. Rescue (Patient not taking: Reported on 12/14/2020), Disp: 4 g, Rfl: 1    lisinopriL (PRINIVIL,ZESTRIL) 20 MG tablet, Take 1 tablet (20 mg total) by mouth once daily., Disp: 90 tablet, Rfl: 1    vitamins  A,C,E-zinc-copper (PRESERVISION AREDS) 7,160-113-100 unit-mg-unit Tab, , Disp: , Rfl:     Review of Systems   Constitutional: Negative.    HENT: Negative for congestion, ear pain, sinus pressure, sinus pain, tinnitus and trouble swallowing.    Eyes: Negative for pain and redness.   Respiratory: Positive for cough. Negative for chest tightness, shortness of breath and wheezing.    Cardiovascular: Negative for chest pain and palpitations.   Gastrointestinal: Negative for abdominal pain, nausea and vomiting.   Genitourinary: Negative for dysuria, frequency and urgency.   Musculoskeletal: Negative for arthralgias, back pain and myalgias.   Skin: Negative for rash and wound.   Neurological: Negative for  "dizziness, weakness, light-headedness and headaches.   Psychiatric/Behavioral: Negative.           Objective:      Vitals:    12/14/20 1135 12/14/20 1137   BP: (!) 152/82 (!) 142/80   Pulse: 80    Weight: 76.2 kg (168 lb)    Height: 5' 2" (1.575 m)      Body mass index is 30.73 kg/m².  Physical Exam  Vitals signs reviewed.   Constitutional:       General: She is not in acute distress.     Appearance: Normal appearance. She is well-developed.   HENT:      Head: Normocephalic and atraumatic.      Right Ear: Tympanic membrane normal.      Left Ear: Tympanic membrane normal.      Nose: Nose normal. No nasal deformity or mucosal edema.      Mouth/Throat:      Dentition: No dental caries or dental abscesses.      Pharynx: No oropharyngeal exudate.   Eyes:      General: Lids are normal.      Pupils: Pupils are equal, round, and reactive to light.   Neck:      Musculoskeletal: Normal range of motion.      Thyroid: No thyromegaly.      Vascular: No JVD.      Trachea: No tracheal tenderness or tracheal deviation.   Cardiovascular:      Rate and Rhythm: Normal rate and regular rhythm.      Heart sounds: Normal heart sounds. No murmur.   Pulmonary:      Effort: Pulmonary effort is normal. No accessory muscle usage or respiratory distress.      Breath sounds: Examination of the right-middle field reveals wheezing. Wheezing present.   Abdominal:      General: Bowel sounds are normal.      Palpations: Abdomen is soft.      Tenderness: There is no abdominal tenderness.   Musculoskeletal: Normal range of motion.         General: No tenderness.   Lymphadenopathy:      Cervical: No cervical adenopathy.   Skin:     General: Skin is warm and dry.      Capillary Refill: Capillary refill takes less than 2 seconds.      Findings: No bruising or ecchymosis.   Neurological:      Mental Status: She is alert and oriented to person, place, and time.   Psychiatric:         Mood and Affect: Mood normal.         Behavior: Behavior normal.       "     Assessment:       1. Paroxysmal atrial fibrillation    2. Essential hypertension    3. Mixed hyperlipidemia    4. Bronchitis    5. BMI 30.0-30.9,adult         Plan:       Paroxysmal atrial fibrillation  -     ELIQUIS 5 mg Tab; Take 1 tablet (5 mg total) by mouth 2 (two) times daily.  Dispense: 180 tablet; Refill: 1  -     metoprolol tartrate (LOPRESSOR) 25 MG tablet; Take 1 tablet (25 mg total) by mouth 2 (two) times a day.  Dispense: 180 tablet; Refill: 1    Essential hypertension  -     lisinopriL (PRINIVIL,ZESTRIL) 20 MG tablet; Take 1 tablet (20 mg total) by mouth once daily.  Dispense: 90 tablet; Refill: 1  -     amLODIPine (NORVASC) 5 MG tablet; Take 1 tablet (5 mg total) by mouth once daily.  Dispense: 90 tablet; Refill: 1    Mixed hyperlipidemia  -     simvastatin (ZOCOR) 20 MG tablet; Take 1 tablet (20 mg total) by mouth every evening.  Dispense: 90 tablet; Refill: 1    Bronchitis  -     azithromycin (Z-SONIYA) 250 MG tablet; Take 2 tablets by mouth on day 1; Take 1 tablet by mouth on days 2-5  Dispense: 6 tablet; Refill: 0  - Advised to take plain Mucinex. Let me know if cough worsens or she develops any other symptoms.     BMI 30.0-30.9,adult      Follow up in about 6 months (around 6/14/2021) for Follow-up with Dr. Mancera.

## 2020-12-23 ENCOUNTER — PATIENT OUTREACH (OUTPATIENT)
Dept: FAMILY MEDICINE | Facility: CLINIC | Age: 85
End: 2020-12-23

## 2020-12-23 ENCOUNTER — TELEPHONE (OUTPATIENT)
Dept: FAMILY MEDICINE | Facility: CLINIC | Age: 85
End: 2020-12-23

## 2020-12-23 PROBLEM — R05.9 COUGH: Status: ACTIVE | Noted: 2020-12-23

## 2020-12-23 NOTE — TELEPHONE ENCOUNTER
----- Message from Mireille Gillis sent at 12/23/2020  2:06 PM CST -----  Regarding: hosp fu appt  Pt is getting discharged from [facility] needing f.u in a week   Dx was pneumonia   Pt [phone]

## 2020-12-23 NOTE — PROGRESS NOTES
Discharge Information     Discharge Date:   12/23/20    Primary Discharge Diagnosis:  PNA    Discharge Summary:  Unavailable      Medication & Order Review     Were medication changes made or new medications added?   Yes    If so, has the patient filled the prescriptions?  Yes     Was Home Health ordered? No    If so, has Home Health contacted patient and/or initiated services?  No    Name of Home Health Agency? N/A    Durable Medical Equipment ordered?  No     If so, has the DME provider contacted patient and delivered equipment?  N/A    Follow Up               Any problems since discharge? No    How is the patient feeling since returning home?  Feeling better    Have you set up recommended follow up appointments?  yes    Schedule Hospital Follow-up appointment within 7-14 days     Notes:        Tiny Bragg

## 2021-01-04 ENCOUNTER — OFFICE VISIT (OUTPATIENT)
Dept: FAMILY MEDICINE | Facility: CLINIC | Age: 86
End: 2021-01-04
Payer: MEDICARE

## 2021-01-04 VITALS
HEIGHT: 62 IN | DIASTOLIC BLOOD PRESSURE: 80 MMHG | OXYGEN SATURATION: 98 % | BODY MASS INDEX: 28.89 KG/M2 | HEART RATE: 84 BPM | SYSTOLIC BLOOD PRESSURE: 130 MMHG | WEIGHT: 157 LBS

## 2021-01-04 DIAGNOSIS — J18.9 COMMUNITY ACQUIRED PNEUMONIA OF RIGHT LOWER LOBE OF LUNG: Primary | ICD-10-CM

## 2021-01-04 DIAGNOSIS — Z09 HOSPITAL DISCHARGE FOLLOW-UP: ICD-10-CM

## 2021-01-04 DIAGNOSIS — I27.21 PULMONARY ARTERIAL HYPERTENSION: ICD-10-CM

## 2021-01-04 DIAGNOSIS — R05.9 COUGH: ICD-10-CM

## 2021-01-04 PROCEDURE — 99496 TRANSJ CARE MGMT HIGH F2F 7D: CPT | Mod: S$GLB,,, | Performed by: NURSE PRACTITIONER

## 2021-01-04 PROCEDURE — 99496 TRANSITIONAL CARE MANAGE SERVICE 7 DAY DISCHARGE: ICD-10-PCS | Mod: S$GLB,,, | Performed by: NURSE PRACTITIONER

## 2021-01-14 ENCOUNTER — OFFICE VISIT (OUTPATIENT)
Dept: FAMILY MEDICINE | Facility: CLINIC | Age: 86
End: 2021-01-14
Payer: MEDICARE

## 2021-01-14 ENCOUNTER — TELEPHONE (OUTPATIENT)
Dept: FAMILY MEDICINE | Facility: CLINIC | Age: 86
End: 2021-01-14

## 2021-01-14 VITALS
HEART RATE: 60 BPM | BODY MASS INDEX: 28.89 KG/M2 | SYSTOLIC BLOOD PRESSURE: 130 MMHG | HEIGHT: 62 IN | DIASTOLIC BLOOD PRESSURE: 78 MMHG | WEIGHT: 157 LBS

## 2021-01-14 DIAGNOSIS — S39.011A STRAIN OF MUSCLE OF RIGHT GROIN REGION: Primary | ICD-10-CM

## 2021-01-14 PROCEDURE — 99212 OFFICE O/P EST SF 10 MIN: CPT | Mod: S$GLB,,, | Performed by: NURSE PRACTITIONER

## 2021-01-14 PROCEDURE — 99212 PR OFFICE/OUTPT VISIT, EST, LEVL II, 10-19 MIN: ICD-10-PCS | Mod: S$GLB,,, | Performed by: NURSE PRACTITIONER

## 2021-01-15 ENCOUNTER — PATIENT MESSAGE (OUTPATIENT)
Dept: FAMILY MEDICINE | Facility: CLINIC | Age: 86
End: 2021-01-15

## 2021-01-15 DIAGNOSIS — B02.9 HERPES ZOSTER WITHOUT COMPLICATION: Primary | ICD-10-CM

## 2021-01-15 RX ORDER — VALACYCLOVIR HYDROCHLORIDE 1 G/1
1000 TABLET, FILM COATED ORAL EVERY 8 HOURS
Qty: 21 TABLET | Refills: 0 | Status: ON HOLD | OUTPATIENT
Start: 2021-01-15 | End: 2021-02-17 | Stop reason: HOSPADM

## 2021-01-21 ENCOUNTER — PATIENT MESSAGE (OUTPATIENT)
Dept: FAMILY MEDICINE | Facility: CLINIC | Age: 86
End: 2021-01-21

## 2021-01-28 ENCOUNTER — PATIENT MESSAGE (OUTPATIENT)
Dept: FAMILY MEDICINE | Facility: CLINIC | Age: 86
End: 2021-01-28

## 2021-01-29 ENCOUNTER — PATIENT MESSAGE (OUTPATIENT)
Dept: FAMILY MEDICINE | Facility: CLINIC | Age: 86
End: 2021-01-29

## 2021-02-16 PROBLEM — R06.02 SHORTNESS OF BREATH: Status: ACTIVE | Noted: 2021-02-16

## 2021-02-16 PROBLEM — R06.09 DYSPNEA ON EXERTION: Status: ACTIVE | Noted: 2021-02-16

## 2021-02-25 ENCOUNTER — TELEPHONE (OUTPATIENT)
Dept: FAMILY MEDICINE | Facility: CLINIC | Age: 86
End: 2021-02-25

## 2021-03-01 ENCOUNTER — OFFICE VISIT (OUTPATIENT)
Dept: FAMILY MEDICINE | Facility: CLINIC | Age: 86
End: 2021-03-01
Payer: MEDICARE

## 2021-03-01 ENCOUNTER — TELEPHONE (OUTPATIENT)
Dept: FAMILY MEDICINE | Facility: CLINIC | Age: 86
End: 2021-03-01

## 2021-03-01 DIAGNOSIS — I50.32 CHRONIC DIASTOLIC HEART FAILURE: Primary | ICD-10-CM

## 2021-03-01 DIAGNOSIS — Z09 HOSPITAL DISCHARGE FOLLOW-UP: ICD-10-CM

## 2021-03-01 DIAGNOSIS — R60.0 BILATERAL LOWER EXTREMITY EDEMA: ICD-10-CM

## 2021-03-01 DIAGNOSIS — R06.09 DYSPNEA ON EXERTION: ICD-10-CM

## 2021-03-01 PROCEDURE — 99213 OFFICE O/P EST LOW 20 MIN: CPT | Mod: S$GLB,,, | Performed by: NURSE PRACTITIONER

## 2021-03-01 PROCEDURE — 99213 PR OFFICE/OUTPT VISIT, EST, LEVL III, 20-29 MIN: ICD-10-PCS | Mod: S$GLB,,, | Performed by: NURSE PRACTITIONER

## 2021-03-03 ENCOUNTER — TELEPHONE (OUTPATIENT)
Dept: FAMILY MEDICINE | Facility: CLINIC | Age: 86
End: 2021-03-03

## 2021-03-04 VITALS
BODY MASS INDEX: 24.66 KG/M2 | SYSTOLIC BLOOD PRESSURE: 138 MMHG | OXYGEN SATURATION: 98 % | WEIGHT: 148 LBS | HEART RATE: 80 BPM | HEIGHT: 65 IN | DIASTOLIC BLOOD PRESSURE: 88 MMHG | RESPIRATION RATE: 16 BRPM

## 2021-03-19 DIAGNOSIS — I50.32 CHRONIC DIASTOLIC HEART FAILURE: Primary | ICD-10-CM

## 2021-05-04 ENCOUNTER — TELEPHONE (OUTPATIENT)
Dept: FAMILY MEDICINE | Facility: CLINIC | Age: 86
End: 2021-05-04

## 2021-06-08 DIAGNOSIS — I10 ESSENTIAL HYPERTENSION: Chronic | ICD-10-CM

## 2021-06-08 DIAGNOSIS — E78.2 MIXED HYPERLIPIDEMIA: Chronic | ICD-10-CM

## 2021-06-08 RX ORDER — AMLODIPINE BESYLATE 5 MG/1
5 TABLET ORAL DAILY
Qty: 90 TABLET | Refills: 1 | Status: SHIPPED | OUTPATIENT
Start: 2021-06-08 | End: 2021-06-21 | Stop reason: SDUPTHER

## 2021-06-08 RX ORDER — SIMVASTATIN 20 MG/1
20 TABLET, FILM COATED ORAL NIGHTLY
Qty: 90 TABLET | Refills: 1 | Status: SHIPPED | OUTPATIENT
Start: 2021-06-08 | End: 2021-06-21 | Stop reason: SDUPTHER

## 2021-06-08 RX ORDER — LISINOPRIL 20 MG/1
20 TABLET ORAL DAILY
Qty: 90 TABLET | Refills: 1 | Status: SHIPPED | OUTPATIENT
Start: 2021-06-08 | End: 2021-06-21 | Stop reason: SDUPTHER

## 2021-06-21 ENCOUNTER — OFFICE VISIT (OUTPATIENT)
Dept: FAMILY MEDICINE | Facility: CLINIC | Age: 86
End: 2021-06-21
Payer: MEDICARE

## 2021-06-21 VITALS
OXYGEN SATURATION: 96 % | SYSTOLIC BLOOD PRESSURE: 132 MMHG | HEIGHT: 65 IN | BODY MASS INDEX: 26.33 KG/M2 | DIASTOLIC BLOOD PRESSURE: 78 MMHG | HEART RATE: 72 BPM | WEIGHT: 158 LBS

## 2021-06-21 DIAGNOSIS — I10 HYPERTENSION, UNSPECIFIED TYPE: ICD-10-CM

## 2021-06-21 DIAGNOSIS — B02.9 HERPES ZOSTER WITHOUT COMPLICATION: ICD-10-CM

## 2021-06-21 DIAGNOSIS — M81.0 AGE-RELATED OSTEOPOROSIS WITHOUT CURRENT PATHOLOGICAL FRACTURE: ICD-10-CM

## 2021-06-21 DIAGNOSIS — M15.9 PRIMARY OSTEOARTHRITIS INVOLVING MULTIPLE JOINTS: Chronic | ICD-10-CM

## 2021-06-21 DIAGNOSIS — I10 ESSENTIAL HYPERTENSION: Chronic | ICD-10-CM

## 2021-06-21 DIAGNOSIS — E78.2 MIXED HYPERLIPIDEMIA: Chronic | ICD-10-CM

## 2021-06-21 DIAGNOSIS — I50.32 CHRONIC DIASTOLIC HEART FAILURE: ICD-10-CM

## 2021-06-21 DIAGNOSIS — I27.21 PULMONARY ARTERIAL HYPERTENSION: ICD-10-CM

## 2021-06-21 DIAGNOSIS — D51.0 PERNICIOUS ANEMIA: ICD-10-CM

## 2021-06-21 DIAGNOSIS — I48.0 PAROXYSMAL ATRIAL FIBRILLATION: Primary | ICD-10-CM

## 2021-06-21 PROCEDURE — 99214 OFFICE O/P EST MOD 30 MIN: CPT | Mod: S$GLB,,, | Performed by: FAMILY MEDICINE

## 2021-06-21 PROCEDURE — 99214 PR OFFICE/OUTPT VISIT, EST, LEVL IV, 30-39 MIN: ICD-10-PCS | Mod: S$GLB,,, | Performed by: FAMILY MEDICINE

## 2021-06-21 RX ORDER — METOPROLOL TARTRATE 25 MG/1
25 TABLET, FILM COATED ORAL 2 TIMES DAILY
Qty: 180 TABLET | Refills: 1 | Status: SHIPPED | OUTPATIENT
Start: 2021-06-21 | End: 2021-12-27 | Stop reason: SDUPTHER

## 2021-06-21 RX ORDER — SIMVASTATIN 20 MG/1
20 TABLET, FILM COATED ORAL NIGHTLY
Qty: 90 TABLET | Refills: 1 | Status: SHIPPED | OUTPATIENT
Start: 2021-06-21 | End: 2021-12-27 | Stop reason: SDUPTHER

## 2021-06-21 RX ORDER — FUROSEMIDE 20 MG/1
20 TABLET ORAL DAILY PRN
Qty: 60 TABLET | Refills: 0 | Status: SHIPPED | OUTPATIENT
Start: 2021-06-21 | End: 2021-12-27 | Stop reason: SDUPTHER

## 2021-06-21 RX ORDER — LISINOPRIL 20 MG/1
20 TABLET ORAL DAILY
Qty: 90 TABLET | Refills: 1 | Status: SHIPPED | OUTPATIENT
Start: 2021-06-21 | End: 2021-12-27 | Stop reason: SDUPTHER

## 2021-06-21 RX ORDER — AMLODIPINE BESYLATE 5 MG/1
5 TABLET ORAL DAILY
Qty: 90 TABLET | Refills: 1 | Status: SHIPPED | OUTPATIENT
Start: 2021-06-21 | End: 2021-12-27 | Stop reason: SDUPTHER

## 2021-12-14 ENCOUNTER — TELEPHONE (OUTPATIENT)
Dept: FAMILY MEDICINE | Facility: CLINIC | Age: 86
End: 2021-12-14
Payer: COMMERCIAL

## 2021-12-14 DIAGNOSIS — E78.2 MIXED HYPERLIPIDEMIA: ICD-10-CM

## 2021-12-14 DIAGNOSIS — Z79.899 ENCOUNTER FOR LONG-TERM (CURRENT) USE OF OTHER MEDICATIONS: Primary | ICD-10-CM

## 2021-12-21 LAB
ALBUMIN SERPL-MCNC: 4 G/DL (ref 3.6–5.1)
ALBUMIN/GLOB SERPL: 1.3 (CALC) (ref 1–2.5)
ALP SERPL-CCNC: 52 U/L (ref 37–153)
ALT SERPL-CCNC: 19 U/L (ref 6–29)
AST SERPL-CCNC: 28 U/L (ref 10–35)
BILIRUB SERPL-MCNC: 1.1 MG/DL (ref 0.2–1.2)
BUN SERPL-MCNC: 22 MG/DL (ref 7–25)
BUN/CREAT SERPL: NORMAL (CALC) (ref 6–22)
CALCIUM SERPL-MCNC: 9.7 MG/DL (ref 8.6–10.4)
CHLORIDE SERPL-SCNC: 103 MMOL/L (ref 98–110)
CHOLEST SERPL-MCNC: 128 MG/DL
CHOLEST/HDLC SERPL: 2 (CALC)
CO2 SERPL-SCNC: 27 MMOL/L (ref 20–32)
CREAT SERPL-MCNC: 0.79 MG/DL (ref 0.6–0.88)
GLOBULIN SER CALC-MCNC: 3.1 G/DL (CALC) (ref 1.9–3.7)
GLUCOSE SERPL-MCNC: 82 MG/DL (ref 65–99)
HDLC SERPL-MCNC: 63 MG/DL
LDLC SERPL CALC-MCNC: 52 MG/DL (CALC)
NONHDLC SERPL-MCNC: 65 MG/DL (CALC)
POTASSIUM SERPL-SCNC: 4.3 MMOL/L (ref 3.5–5.3)
PROT SERPL-MCNC: 7.1 G/DL (ref 6.1–8.1)
SODIUM SERPL-SCNC: 137 MMOL/L (ref 135–146)
TRIGL SERPL-MCNC: 58 MG/DL

## 2021-12-27 ENCOUNTER — OFFICE VISIT (OUTPATIENT)
Dept: FAMILY MEDICINE | Facility: CLINIC | Age: 86
End: 2021-12-27
Payer: MEDICARE

## 2021-12-27 VITALS
SYSTOLIC BLOOD PRESSURE: 130 MMHG | HEIGHT: 65 IN | OXYGEN SATURATION: 97 % | HEART RATE: 77 BPM | BODY MASS INDEX: 26.33 KG/M2 | WEIGHT: 158 LBS | DIASTOLIC BLOOD PRESSURE: 80 MMHG

## 2021-12-27 DIAGNOSIS — R06.02 SHORTNESS OF BREATH: ICD-10-CM

## 2021-12-27 DIAGNOSIS — I10 PRIMARY HYPERTENSION: ICD-10-CM

## 2021-12-27 DIAGNOSIS — I48.0 PAROXYSMAL ATRIAL FIBRILLATION: Primary | ICD-10-CM

## 2021-12-27 DIAGNOSIS — M81.0 AGE-RELATED OSTEOPOROSIS WITHOUT CURRENT PATHOLOGICAL FRACTURE: ICD-10-CM

## 2021-12-27 DIAGNOSIS — I50.32 CHRONIC DIASTOLIC HEART FAILURE: ICD-10-CM

## 2021-12-27 DIAGNOSIS — E78.2 MIXED HYPERLIPIDEMIA: Chronic | ICD-10-CM

## 2021-12-27 DIAGNOSIS — I10 ESSENTIAL HYPERTENSION: Chronic | ICD-10-CM

## 2021-12-27 PROCEDURE — 99214 PR OFFICE/OUTPT VISIT, EST, LEVL IV, 30-39 MIN: ICD-10-PCS | Mod: S$GLB,,, | Performed by: FAMILY MEDICINE

## 2021-12-27 PROCEDURE — 99214 OFFICE O/P EST MOD 30 MIN: CPT | Mod: S$GLB,,, | Performed by: FAMILY MEDICINE

## 2021-12-27 RX ORDER — AMLODIPINE BESYLATE 5 MG/1
5 TABLET ORAL DAILY
Qty: 90 TABLET | Refills: 1 | Status: ON HOLD | OUTPATIENT
Start: 2021-12-27 | End: 2023-12-12 | Stop reason: HOSPADM

## 2021-12-27 RX ORDER — METOPROLOL TARTRATE 25 MG/1
25 TABLET, FILM COATED ORAL 2 TIMES DAILY
Qty: 180 TABLET | Refills: 1 | Status: SHIPPED | OUTPATIENT
Start: 2021-12-27 | End: 2023-12-26 | Stop reason: SDUPTHER

## 2021-12-27 RX ORDER — APIXABAN 5 MG/1
5 TABLET, FILM COATED ORAL 2 TIMES DAILY
Qty: 180 TABLET | Refills: 1 | Status: SHIPPED | OUTPATIENT
Start: 2021-12-27 | End: 2022-05-23

## 2021-12-27 RX ORDER — LISINOPRIL 20 MG/1
20 TABLET ORAL DAILY
Qty: 90 TABLET | Refills: 1 | Status: SHIPPED | OUTPATIENT
Start: 2021-12-27 | End: 2022-02-08 | Stop reason: SDUPTHER

## 2021-12-27 RX ORDER — SIMVASTATIN 20 MG/1
20 TABLET, FILM COATED ORAL NIGHTLY
Qty: 90 TABLET | Refills: 1 | Status: SHIPPED | OUTPATIENT
Start: 2021-12-27 | End: 2023-12-26 | Stop reason: SDUPTHER

## 2021-12-27 RX ORDER — FUROSEMIDE 20 MG/1
20 TABLET ORAL DAILY PRN
Qty: 60 TABLET | Refills: 0 | Status: ON HOLD | OUTPATIENT
Start: 2021-12-27 | End: 2022-08-18 | Stop reason: HOSPADM

## 2021-12-29 ENCOUNTER — PATIENT MESSAGE (OUTPATIENT)
Dept: FAMILY MEDICINE | Facility: CLINIC | Age: 86
End: 2021-12-29
Payer: COMMERCIAL

## 2021-12-29 RX ORDER — AZITHROMYCIN 250 MG/1
TABLET, FILM COATED ORAL
Qty: 6 TABLET | Refills: 0 | Status: SHIPPED | OUTPATIENT
Start: 2021-12-29 | End: 2022-01-03

## 2022-02-08 DIAGNOSIS — I10 ESSENTIAL HYPERTENSION: Chronic | ICD-10-CM

## 2022-02-08 RX ORDER — LISINOPRIL 20 MG/1
20 TABLET ORAL DAILY
Qty: 90 TABLET | Refills: 1 | Status: SHIPPED | OUTPATIENT
Start: 2022-02-08 | End: 2023-12-26 | Stop reason: SDUPTHER

## 2022-02-08 NOTE — TELEPHONE ENCOUNTER
----- Message from Savi Pacheco sent at 2/8/2022 11:04 AM CST -----  Pt's daughter calling for refill on Lisinopril 20 mg send to [facility] mail order. Said she has [employer] Medicare now  # [phone]

## 2022-05-11 ENCOUNTER — TELEPHONE (OUTPATIENT)
Dept: FAMILY MEDICINE | Facility: CLINIC | Age: 87
End: 2022-05-11

## 2022-05-11 NOTE — TELEPHONE ENCOUNTER
----- Message from Savi Pacheco sent at 5/11/2022  3:13 PM CDT -----  Татьяна with [facility] calling said the pt needs to be re certified and a pulmonary walk test and new oxygen orders during the next visit. She is wanting this to be done all on the same day. She is refaxing the request for everything needed if there are any questions call her prior to the appt.  Татьяна wants a call back to confirm all this has been done.   # [phone]

## 2022-05-23 ENCOUNTER — OFFICE VISIT (OUTPATIENT)
Dept: FAMILY MEDICINE | Facility: CLINIC | Age: 87
End: 2022-05-23
Payer: MEDICARE

## 2022-05-23 VITALS
HEIGHT: 60 IN | HEART RATE: 76 BPM | DIASTOLIC BLOOD PRESSURE: 64 MMHG | OXYGEN SATURATION: 91 % | BODY MASS INDEX: 28.66 KG/M2 | WEIGHT: 146 LBS | SYSTOLIC BLOOD PRESSURE: 102 MMHG

## 2022-05-23 DIAGNOSIS — I10 ESSENTIAL HYPERTENSION: ICD-10-CM

## 2022-05-23 DIAGNOSIS — I48.0 PAROXYSMAL ATRIAL FIBRILLATION: ICD-10-CM

## 2022-05-23 DIAGNOSIS — I50.32 CHRONIC DIASTOLIC HEART FAILURE: ICD-10-CM

## 2022-05-23 DIAGNOSIS — E78.2 MIXED HYPERLIPIDEMIA: ICD-10-CM

## 2022-05-23 DIAGNOSIS — R06.09 DYSPNEA ON EXERTION: Primary | ICD-10-CM

## 2022-05-23 DIAGNOSIS — M15.9 PRIMARY OSTEOARTHRITIS INVOLVING MULTIPLE JOINTS: ICD-10-CM

## 2022-05-23 PROCEDURE — 99214 OFFICE O/P EST MOD 30 MIN: CPT | Mod: S$GLB,,, | Performed by: PHYSICIAN ASSISTANT

## 2022-05-23 PROCEDURE — 99214 PR OFFICE/OUTPT VISIT, EST, LEVL IV, 30-39 MIN: ICD-10-PCS | Mod: S$GLB,,, | Performed by: PHYSICIAN ASSISTANT

## 2022-05-23 RX ORDER — APIXABAN 2.5 MG/1
2.5 TABLET, FILM COATED ORAL 2 TIMES DAILY
COMMUNITY
Start: 2022-04-25

## 2022-05-23 NOTE — PROGRESS NOTES
SUBJECTIVE:    Patient ID: Fatimah Garber is a 89 y.o. female.    Chief Complaint: Follow-up (Brought bottles/ she states her O2 has been staying around 92%/ lc)    Pt is a 89 y.o. female who presents today for a 6-month follow-up.  She presents today with her daughter, Elisa.  Overall, she reports feeling well and is without complaints today. She is currently on 3 L nasal cannula oxygen therapy, with exertion alone.  At home, SpO2 ranges from 91-92% on room air while at rest.  With exertion, SpO2 drops to 87% on room air, but quickly returns to baseline.  With exertion, she experiences transient SOB  after walking 25-50 feet.  She continues to ambulate with the assistance of a Rollator which is necessary to assist with her dyspnia on minimal exertion. This SOB resolves with rest alone.  At rest, she denies any SOB, coughing, or wheezing.    Dr. Meza (cardiology) - manages her history of chronic AFib and chronic diastolic CHF.  She follows up every 4 months.  Currently on Eliquis 2.5 mg b.i.d., Lisinopril 20mg q.d., Norvasc 5mg q.d., and Lopressor 25 mg b.i.d..  BP at home is stable and well controlled.  She does experience chronic lymphedema which is currently well controlled on Lasix 20 mg 3x/week.    She has received 4 doses of the Pfizer COVID-19 vaccine at this time.    Lab work was recently completed with her cardiologist and is UTD.    Telephone on 12/14/2021   Component Date Value Ref Range Status    Glucose 12/20/2021 82  65 - 99 mg/dL Final    BUN 12/20/2021 22  7 - 25 mg/dL Final    Creatinine 12/20/2021 0.79  0.60 - 0.88 mg/dL Final    eGFR if non  12/20/2021 65  > OR = 60 mL/min/1.73m2 Final    eGFR if  12/20/2021 76  > OR = 60 mL/min/1.73m2 Final    BUN/Creatinine Ratio 12/20/2021 NOT APPLICABLE  6 - 22 (calc) Final    Sodium 12/20/2021 137  135 - 146 mmol/L Final    Potassium 12/20/2021 4.3  3.5 - 5.3 mmol/L Final    Chloride 12/20/2021 103  98 - 110 mmol/L  Final    CO2 12/20/2021 27  20 - 32 mmol/L Final    Calcium 12/20/2021 9.7  8.6 - 10.4 mg/dL Final    Total Protein 12/20/2021 7.1  6.1 - 8.1 g/dL Final    Albumin 12/20/2021 4.0  3.6 - 5.1 g/dL Final    Globulin, Total 12/20/2021 3.1  1.9 - 3.7 g/dL (calc) Final    Albumin/Globulin Ratio 12/20/2021 1.3  1.0 - 2.5 (calc) Final    Total Bilirubin 12/20/2021 1.1  0.2 - 1.2 mg/dL Final    Alkaline Phosphatase 12/20/2021 52  37 - 153 U/L Final    AST 12/20/2021 28  10 - 35 U/L Final    ALT 12/20/2021 19  6 - 29 U/L Final    Cholesterol 12/20/2021 128  <200 mg/dL Final    HDL 12/20/2021 63  > OR = 50 mg/dL Final    Triglycerides 12/20/2021 58  <150 mg/dL Final    LDL Cholesterol 12/20/2021 52  mg/dL (calc) Final    HDL/Cholesterol Ratio 12/20/2021 2.0  <5.0 (calc) Final    Non HDL Chol. (LDL+VLDL) 12/20/2021 65  <130 mg/dL (calc) Final       Past Medical History:   Diagnosis Date    Atrial fibrillation     Hyperlipidemia     Hypertension     Osteoarthritis     Shingles 02/11/2021    completed medicine     Past Surgical History:   Procedure Laterality Date    HYSTERECTOMY       Family History   Problem Relation Age of Onset    Hypertension Mother     Kidney disease Mother     Heart disease Father        Marital Status:   Alcohol History:  reports current alcohol use of about 1.0 standard drink of alcohol per week.  Tobacco History:  reports that she has never smoked. She has never used smokeless tobacco.  Drug History:  reports no history of drug use.    Health Maintenance Topics with due status: Not Due       Topic Last Completion Date    Lipid Panel 12/20/2021     Immunization History   Administered Date(s) Administered    COVID-19, MRNA, LN-S, PF (Pfizer) (Purple Cap) 01/11/2021, 02/01/2021, 10/11/2021    Hepatitis A, Adult 09/16/2005    Influenza - High Dose - PF (65 years and older) 10/13/2014, 10/26/2015, 10/10/2016, 10/15/2018, 09/30/2019    Influenza - Quadrivalent - High Dose -  PF (65 years and older) 10/19/2020, 10/11/2021    Influenza - Trivalent (ADULT) 11/30/2011, 10/01/2014    Pneumococcal Conjugate - 13 Valent 02/03/2016    Pneumococcal Polysaccharide - 23 Valent 01/04/2013       Review of patient's allergies indicates:  No Known Allergies    Current Outpatient Medications:     acetaminophen (TYLENOL) 500 MG tablet, Take 1,000 mg by mouth every 8 (eight) hours., Disp: , Rfl:     C,E,zinc,copper 11-omega3s-lut (OCUVITE ADULT 50 PLUS) 250-5-1 mg Cap, Take by mouth., Disp: , Rfl:     calcium carbonate/vitamin D3 (CALCIUM 600 + D,3, ORAL), Take 1 tablet by mouth once daily., Disp: , Rfl:     ELIQUIS 2.5 mg Tab, Take 2.5 mg by mouth 2 (two) times daily., Disp: , Rfl:     furosemide (LASIX) 20 MG tablet, Take 1 tablet (20 mg total) by mouth daily as needed (weight gain >3lbs in 24 hours or >5lbs over 3-5 days; increased SOB)., Disp: 60 tablet, Rfl: 0    latanoprost 0.005 % ophthalmic solution, Place 1 drop into both eyes once daily., Disp: , Rfl:     metoprolol tartrate (LOPRESSOR) 25 MG tablet, Take 1 tablet (25 mg total) by mouth 2 (two) times a day., Disp: 180 tablet, Rfl: 1    multivitamin-min-FA-ginkgo 400-120 mcg-mg Tab, take 1 tablet by mouth daily, Disp: , Rfl:     OXYGEN-AIR DELIVERY SYSTEMS MISC, 2 L/min by Nasal route as needed (Shortness of breath)., Disp: , Rfl:     RESTASIS 0.05 % ophthalmic emulsion, Place 1 drop into both eyes 2 (two) times daily. , Disp: , Rfl:     simvastatin (ZOCOR) 20 MG tablet, Take 1 tablet (20 mg total) by mouth every evening., Disp: 90 tablet, Rfl: 1    vitamin B complex (B COMPLEX-VITAMIN B12 ORAL), Take 1,000 mcg by mouth once daily., Disp: , Rfl:     vitamins  A,C,E-zinc-copper 7,160 unit- 113 mg-100 unit Tab, Take 1 tablet by mouth once daily., Disp: , Rfl:     amLODIPine (NORVASC) 5 MG tablet, Take 1 tablet (5 mg total) by mouth once daily., Disp: 90 tablet, Rfl: 1    lisinopriL (PRINIVIL,ZESTRIL) 20 MG tablet, Take 1 tablet  "(20 mg total) by mouth once daily., Disp: 90 tablet, Rfl: 1    Review of Systems   Constitutional: Negative for activity change, chills, fatigue and fever.   HENT: Negative for congestion, postnasal drip, rhinorrhea and sore throat.    Respiratory: Positive for shortness of breath ("With minimal exertion."). Negative for cough and wheezing.    Cardiovascular: Positive for leg swelling. Negative for chest pain and palpitations.   Gastrointestinal: Negative for abdominal pain, constipation, diarrhea, nausea and vomiting.   Genitourinary: Negative for difficulty urinating, dysuria, frequency, hematuria and urgency.   Musculoskeletal: Negative for arthralgias and myalgias.   Neurological: Negative for dizziness, syncope, weakness, light-headedness and headaches.   Psychiatric/Behavioral: Negative for behavioral problems.          Objective:      Vitals:    05/23/22 1039   BP: 102/64   Pulse: 76   SpO2: (!) 91%   Weight: 66.2 kg (146 lb)   Height: 5' (1.524 m)     Physical Exam  Vitals and nursing note reviewed.   Constitutional:       General: She is not in acute distress.     Appearance: Normal appearance. She is well-developed and normal weight. She is not ill-appearing, toxic-appearing or diaphoretic.      Comments: Frail, elderly WF sitting erect in an office chair in no acute distress.   HENT:      Head: Normocephalic and atraumatic.      Right Ear: External ear normal.      Left Ear: External ear normal.      Nose: Nose normal.      Mouth/Throat:      Mouth: Mucous membranes are moist.      Pharynx: Oropharynx is clear.   Eyes:      General: No scleral icterus.        Right eye: No discharge.         Left eye: No discharge.      Extraocular Movements: Extraocular movements intact.      Conjunctiva/sclera: Conjunctivae normal.      Pupils: Pupils are equal, round, and reactive to light.   Neck:      Thyroid: No thyromegaly.   Cardiovascular:      Rate and Rhythm: Normal rate. Rhythm irregular.      Heart sounds: " Normal heart sounds. No murmur heard.     Comments: AFib is present  Pulmonary:      Effort: Pulmonary effort is normal.      Breath sounds: Normal breath sounds. No wheezing, rhonchi or rales.   Abdominal:      General: Bowel sounds are normal. There is no distension.      Palpations: Abdomen is soft.      Tenderness: There is no abdominal tenderness. There is no guarding.   Musculoskeletal:         General: No tenderness or deformity. Normal range of motion.      Cervical back: Normal range of motion and neck supple. No rigidity.      Lumbar back: Normal. No spasms.      Right lower leg: Edema present.      Left lower leg: Edema present.      Comments: Bends 90 degrees at  waist, shoulders have good range of motion, knees are crepitant.    1+ venous stasis edema and venous stasis dermatitis and fibrosis with scarring to both ankles   Skin:     General: Skin is warm and dry.      Coloration: Skin is not jaundiced.      Findings: No rash.   Neurological:      Mental Status: She is alert and oriented to person, place, and time.      Cranial Nerves: No cranial nerve deficit.      Motor: Weakness present.      Coordination: Coordination normal.      Gait: Gait abnormal (Slow cautious gait with a Rollator).   Psychiatric:         Mood and Affect: Mood normal.         Behavior: Behavior normal.         Thought Content: Thought content normal.         Judgment: Judgment normal.           Assessment:       1. Dyspnea on exertion    2. Paroxysmal atrial fibrillation    3. Chronic diastolic heart failure    4. Essential hypertension    5. Mixed hyperlipidemia    6. Primary osteoarthritis involving multiple joints           Plan:       Dyspnea on exertion  Comments:  Continue home oxygen concentrator at 3 L with exertion.  DME order signed and sent today for new supplies.  Continue Lasix 20mg, 3x/week  Orders:  -     HME - OTHER    Paroxysmal atrial fibrillation  Comments:  Currently stable and well controlled.  Continue  current medication regimen as is and continue following up with Dr. Meza (cardiology).    Chronic diastolic heart failure  Comments:  Continue Lasix 20mg t.i.d.  If lymphedema becomes problematic, will increase to 40mg t.i.d..    Essential hypertension  Comments:  Stable and well controlled.  Continue as is.  No refills requested today.    Mixed hyperlipidemia    Primary osteoarthritis involving multiple joints      Follow up in about 6 months (around 11/23/2022) for TAMEZ.        5/23/2022 Bulmaro Corona PA-C

## 2022-05-25 ENCOUNTER — TELEPHONE (OUTPATIENT)
Dept: FAMILY MEDICINE | Facility: CLINIC | Age: 87
End: 2022-05-25

## 2022-05-25 NOTE — TELEPHONE ENCOUNTER
Spoke to nurse with [facility]. She needs feng to Addend his OV saying At home, SpO2 ranges from 91-92% on room air while at rest.  With exertion, SpO2 drops to 87% on room air , but quickly returns to baseline.     States pt should have also been walked on 02 and 02 at rest. Pt is going to come into the office Tuesday so a nurse can walk her on 02 and note it.     They also need a different 02 order. Once she comes to the clinic a can reload the correct 02 order.

## 2022-05-26 NOTE — TELEPHONE ENCOUNTER
I attended my portion of the note. Once patient presents next week, the additional information and new order will be placed. Thanks.

## 2022-06-01 ENCOUNTER — OFFICE VISIT (OUTPATIENT)
Dept: FAMILY MEDICINE | Facility: CLINIC | Age: 87
End: 2022-06-01
Payer: MEDICARE

## 2022-06-01 VITALS
HEART RATE: 84 BPM | HEIGHT: 60 IN | DIASTOLIC BLOOD PRESSURE: 82 MMHG | SYSTOLIC BLOOD PRESSURE: 130 MMHG | TEMPERATURE: 98 F | WEIGHT: 146 LBS | BODY MASS INDEX: 28.66 KG/M2

## 2022-06-01 DIAGNOSIS — R06.09 DYSPNEA ON EXERTION: ICD-10-CM

## 2022-06-01 DIAGNOSIS — R60.0 BILATERAL LOWER EXTREMITY EDEMA: ICD-10-CM

## 2022-06-01 DIAGNOSIS — I10 ESSENTIAL HYPERTENSION: ICD-10-CM

## 2022-06-01 DIAGNOSIS — E78.2 MIXED HYPERLIPIDEMIA: ICD-10-CM

## 2022-06-01 DIAGNOSIS — I48.0 PAROXYSMAL ATRIAL FIBRILLATION: ICD-10-CM

## 2022-06-01 DIAGNOSIS — I27.21 PULMONARY ARTERIAL HYPERTENSION: ICD-10-CM

## 2022-06-01 DIAGNOSIS — J44.9 CHRONIC OBSTRUCTIVE PULMONARY DISEASE, UNSPECIFIED COPD TYPE: Primary | ICD-10-CM

## 2022-06-01 PROCEDURE — 99213 OFFICE O/P EST LOW 20 MIN: CPT | Mod: S$GLB,,, | Performed by: PHYSICIAN ASSISTANT

## 2022-06-01 PROCEDURE — 99213 PR OFFICE/OUTPT VISIT, EST, LEVL III, 20-29 MIN: ICD-10-PCS | Mod: S$GLB,,, | Performed by: PHYSICIAN ASSISTANT

## 2022-06-01 NOTE — PROGRESS NOTES
SUBJECTIVE:    Patient ID: Fatimah Garber is a 89 y.o. female.    Chief Complaint: o2  (No bottles / Ta)    Pt is a 89 y.o. female who presents today for an Oxygen evaluation.  Overall, she reports feeling well and is without complaints today. She is currently on 3 L nasal cannula oxygen therapy, with exertion alone. At rest on room air, SpO2 is 91%.  With exertion on room air, SpO2 drops to 87%, but quickly returns to baseline.  Oxygen at rest with nasal cannula oxygen therapy, SpO2 is 97%.  Oxygen with activity with nasal cannula oxygen therapy, SpO2 is 91%.  With exertion, she experiences SOB after walking 25-50 feet.     Telephone on 12/14/2021   Component Date Value Ref Range Status    Glucose 12/20/2021 82  65 - 99 mg/dL Final    BUN 12/20/2021 22  7 - 25 mg/dL Final    Creatinine 12/20/2021 0.79  0.60 - 0.88 mg/dL Final    eGFR if non  12/20/2021 65  > OR = 60 mL/min/1.73m2 Final    eGFR if  12/20/2021 76  > OR = 60 mL/min/1.73m2 Final    BUN/Creatinine Ratio 12/20/2021 NOT APPLICABLE  6 - 22 (calc) Final    Sodium 12/20/2021 137  135 - 146 mmol/L Final    Potassium 12/20/2021 4.3  3.5 - 5.3 mmol/L Final    Chloride 12/20/2021 103  98 - 110 mmol/L Final    CO2 12/20/2021 27  20 - 32 mmol/L Final    Calcium 12/20/2021 9.7  8.6 - 10.4 mg/dL Final    Total Protein 12/20/2021 7.1  6.1 - 8.1 g/dL Final    Albumin 12/20/2021 4.0  3.6 - 5.1 g/dL Final    Globulin, Total 12/20/2021 3.1  1.9 - 3.7 g/dL (calc) Final    Albumin/Globulin Ratio 12/20/2021 1.3  1.0 - 2.5 (calc) Final    Total Bilirubin 12/20/2021 1.1  0.2 - 1.2 mg/dL Final    Alkaline Phosphatase 12/20/2021 52  37 - 153 U/L Final    AST 12/20/2021 28  10 - 35 U/L Final    ALT 12/20/2021 19  6 - 29 U/L Final    Cholesterol 12/20/2021 128  <200 mg/dL Final    HDL 12/20/2021 63  > OR = 50 mg/dL Final    Triglycerides 12/20/2021 58  <150 mg/dL Final    LDL Cholesterol 12/20/2021 52  mg/dL (calc) Final     HDL/Cholesterol Ratio 12/20/2021 2.0  <5.0 (calc) Final    Non HDL Chol. (LDL+VLDL) 12/20/2021 65  <130 mg/dL (calc) Final       Past Medical History:   Diagnosis Date    Atrial fibrillation     Hyperlipidemia     Hypertension     Osteoarthritis     Shingles 02/11/2021    completed medicine     Past Surgical History:   Procedure Laterality Date    HYSTERECTOMY       Family History   Problem Relation Age of Onset    Hypertension Mother     Kidney disease Mother     Heart disease Father        Marital Status:   Alcohol History:  reports current alcohol use of about 1.0 standard drink of alcohol per week.  Tobacco History:  reports that she has never smoked. She has never used smokeless tobacco.  Drug History:  reports no history of drug use.    Health Maintenance Topics with due status: Not Due       Topic Last Completion Date    Lipid Panel 12/20/2021     Immunization History   Administered Date(s) Administered    COVID-19, MRNA, LN-S, PF (Pfizer) (Purple Cap) 01/11/2021, 02/01/2021, 10/11/2021    Hepatitis A, Adult 09/16/2005    Influenza - High Dose - PF (65 years and older) 10/13/2014, 10/26/2015, 10/10/2016, 10/15/2018, 09/30/2019    Influenza - Quadrivalent - High Dose - PF (65 years and older) 10/19/2020, 10/11/2021    Influenza - Trivalent (ADULT) 11/30/2011, 10/01/2014    Pneumococcal Conjugate - 13 Valent 02/03/2016    Pneumococcal Polysaccharide - 23 Valent 01/04/2013       Review of patient's allergies indicates:  No Known Allergies    Current Outpatient Medications:     acetaminophen (TYLENOL) 500 MG tablet, Take 1,000 mg by mouth every 8 (eight) hours., Disp: , Rfl:     amLODIPine (NORVASC) 5 MG tablet, Take 1 tablet (5 mg total) by mouth once daily., Disp: 90 tablet, Rfl: 1    C,E,zinc,copper 11-omega3s-lut (OCUVITE ADULT 50 PLUS) 250-5-1 mg Cap, Take by mouth., Disp: , Rfl:     calcium carbonate/vitamin D3 (CALCIUM 600 + D,3, ORAL), Take 1 tablet by mouth once daily., Disp:  ", Rfl:     ELIQUIS 2.5 mg Tab, Take 2.5 mg by mouth 2 (two) times daily., Disp: , Rfl:     furosemide (LASIX) 20 MG tablet, Take 1 tablet (20 mg total) by mouth daily as needed (weight gain >3lbs in 24 hours or >5lbs over 3-5 days; increased SOB)., Disp: 60 tablet, Rfl: 0    latanoprost 0.005 % ophthalmic solution, Place 1 drop into both eyes once daily., Disp: , Rfl:     lisinopriL (PRINIVIL,ZESTRIL) 20 MG tablet, Take 1 tablet (20 mg total) by mouth once daily., Disp: 90 tablet, Rfl: 1    metoprolol tartrate (LOPRESSOR) 25 MG tablet, Take 1 tablet (25 mg total) by mouth 2 (two) times a day., Disp: 180 tablet, Rfl: 1    multivitamin-min-FA-ginkgo 400-120 mcg-mg Tab, take 1 tablet by mouth daily, Disp: , Rfl:     OXYGEN-AIR DELIVERY SYSTEMS MISC, 2 L/min by Nasal route as needed (Shortness of breath)., Disp: , Rfl:     RESTASIS 0.05 % ophthalmic emulsion, Place 1 drop into both eyes 2 (two) times daily. , Disp: , Rfl:     simvastatin (ZOCOR) 20 MG tablet, Take 1 tablet (20 mg total) by mouth every evening., Disp: 90 tablet, Rfl: 1    vitamin B complex (B COMPLEX-VITAMIN B12 ORAL), Take 1,000 mcg by mouth once daily., Disp: , Rfl:     vitamins  A,C,E-zinc-copper 7,160 unit- 113 mg-100 unit Tab, Take 1 tablet by mouth once daily., Disp: , Rfl:     Review of Systems   Constitutional: Negative for activity change, chills, fatigue and fever.   HENT: Negative for congestion.    Respiratory: Positive for shortness of breath ("With exertion."). Negative for cough and wheezing.    Cardiovascular: Negative for chest pain and palpitations.   Gastrointestinal: Negative for abdominal pain, constipation, diarrhea, nausea and vomiting.   Genitourinary: Negative for difficulty urinating, dysuria and frequency.   Musculoskeletal: Negative for arthralgias and myalgias.   Neurological: Negative for dizziness, syncope, weakness and light-headedness.   Psychiatric/Behavioral: Negative for behavioral problems.      "     Objective:      Vitals:    06/01/22 1124   BP: 130/82   Pulse: 84   Temp: 98.2 °F (36.8 °C)   Weight: 66.2 kg (146 lb)   Height: 5' (1.524 m)     Physical Exam  Vitals and nursing note reviewed.   Constitutional:       General: She is not in acute distress.     Appearance: Normal appearance. She is well-developed and normal weight. She is not ill-appearing, toxic-appearing or diaphoretic.      Comments: Frail, elderly WF sitting erect in an office chair in no acute distress.   HENT:      Head: Normocephalic and atraumatic.      Right Ear: External ear normal.      Left Ear: External ear normal.      Nose: Nose normal.      Mouth/Throat:      Mouth: Mucous membranes are moist.      Pharynx: Oropharynx is clear.   Eyes:      General: No scleral icterus.        Right eye: No discharge.         Left eye: No discharge.      Extraocular Movements: Extraocular movements intact.      Conjunctiva/sclera: Conjunctivae normal.      Pupils: Pupils are equal, round, and reactive to light.   Neck:      Thyroid: No thyromegaly.   Cardiovascular:      Rate and Rhythm: Normal rate and regular rhythm.      Heart sounds: Normal heart sounds. No murmur heard.  Pulmonary:      Effort: Pulmonary effort is normal.      Breath sounds: Normal breath sounds. No wheezing, rhonchi or rales.      Comments: Patient ambulated around the office with and without oxygen therapy.  SpO2 are as listed in HPI.  Adequate breath sounds in diffuse bilateral lung fields, with no signs consolidation appreciated.  Abdominal:      General: Bowel sounds are normal. There is no distension.      Palpations: Abdomen is soft.      Tenderness: There is no abdominal tenderness. There is no guarding.   Musculoskeletal:         General: No tenderness or deformity. Normal range of motion.      Cervical back: Normal range of motion and neck supple. No rigidity.      Lumbar back: Normal. No spasms.      Right lower leg: Edema present.      Left lower leg: Edema  present.      Comments: Bends 90 degrees at  waist, shoulders have good range of motion, knees are crepitant.    1+ venous stasis edema and venous stasis dermatitis and fibrosis with scarring to both ankles   Skin:     General: Skin is warm and dry.      Coloration: Skin is not jaundiced.      Findings: No rash.   Neurological:      Mental Status: She is alert and oriented to person, place, and time.      Cranial Nerves: No cranial nerve deficit.      Motor: Weakness present.      Coordination: Coordination normal.      Gait: Gait abnormal (Slow cautious gait with a Rollator).   Psychiatric:         Mood and Affect: Mood normal.         Behavior: Behavior normal.         Thought Content: Thought content normal.         Judgment: Judgment normal.           Assessment:       1. Chronic obstructive pulmonary disease, unspecified COPD type    2. Dyspnea on exertion    3. Pulmonary arterial hypertension    4. Essential hypertension    5. Mixed hyperlipidemia    6. Paroxysmal atrial fibrillation    7. Bilateral lower extremity edema           Plan:       Chronic obstructive pulmonary disease, unspecified COPD type  Comments:  Oxygen evaluation performed in office today.  Continue home oxygen concentrator at 3 L with exertion.  Amb ref for Home O2 therapy sent today.   Orders:  -     OXYGEN FOR HOME USE    Dyspnea on exertion  -     OXYGEN FOR HOME USE    Pulmonary arterial hypertension    Essential hypertension  Stable and well controlled.  Continue as is.  No refills requested today    Mixed hyperlipidemia    Paroxysmal atrial fibrillation  Currently stable and well controlled.  Continue current medication regimen as is and continue following up with Dr. Meza (cardiology).    Bilateral lower extremity edema  Continue Lasix 20mg t.i.d.  If lymphedema becomes problematic, will increase to 40mg t.i.d..    Follow up for Keep follow-up appointment as scheduled..        6/1/2022 Bulmaro Corona PA-C

## 2022-06-13 ENCOUNTER — TELEPHONE (OUTPATIENT)
Dept: FAMILY MEDICINE | Facility: CLINIC | Age: 87
End: 2022-06-13

## 2022-06-13 NOTE — TELEPHONE ENCOUNTER
----- Message from Corrine Hollins sent at 6/13/2022 12:56 PM CDT -----  Татьяна with [facility] called and stated that she need to speak to Kaley about the paper work that was faxed to them about the patient oxygen and she stated that she had to fax it back because the information was incorrect please give her a call at [phone]

## 2022-06-13 NOTE — TELEPHONE ENCOUNTER
Spoke with Татьяна with [facility] in regards to recent message sent. Татьяна went over with me about order that need to be revised. Fixed the order for Bulmaro and re-faxed to Татьяна.

## 2022-06-13 NOTE — TELEPHONE ENCOUNTER
Left message on voice mail for Татьяна to call back in regards to [facility] orders about oxygen

## 2022-07-18 ENCOUNTER — OFFICE VISIT (OUTPATIENT)
Dept: ORTHOPEDICS | Facility: CLINIC | Age: 87
End: 2022-07-18
Payer: MEDICARE

## 2022-07-18 ENCOUNTER — HOSPITAL ENCOUNTER (OUTPATIENT)
Dept: RADIOLOGY | Facility: HOSPITAL | Age: 87
Discharge: HOME | End: 2022-07-18
Attending: ORTHOPAEDIC SURGERY
Payer: MEDICARE

## 2022-07-18 VITALS — WEIGHT: 146 LBS | HEIGHT: 60 IN | BODY MASS INDEX: 28.66 KG/M2

## 2022-07-18 DIAGNOSIS — M16.12 PRIMARY OSTEOARTHRITIS OF LEFT HIP: Primary | ICD-10-CM

## 2022-07-18 DIAGNOSIS — S70.02XA CONTUSION OF LEFT HIP, INITIAL ENCOUNTER: ICD-10-CM

## 2022-07-18 DIAGNOSIS — S72.052A CLOSED FRACTURE OF HEAD OF LEFT FEMUR, INITIAL ENCOUNTER: ICD-10-CM

## 2022-07-18 PROCEDURE — 99203 PR OFFICE/OUTPT VISIT, NEW, LEVL III, 30-44 MIN: ICD-10-PCS | Mod: S$GLB,,, | Performed by: ORTHOPAEDIC SURGERY

## 2022-07-18 PROCEDURE — 99203 OFFICE O/P NEW LOW 30 MIN: CPT | Mod: S$GLB,,, | Performed by: ORTHOPAEDIC SURGERY

## 2022-07-18 PROCEDURE — 73721 MRI JNT OF LWR EXTRE W/O DYE: CPT | Mod: TC,PO,LT

## 2022-07-18 NOTE — PROGRESS NOTES
Subjective:       Patient ID: Fatimah Garber is a 89 y.o. female.    Chief Complaint: Pain of the Pelvis (PT is here with complains of Left Hip/Pelvis pain after a fall on 7/13/12) and Pain of the Left Hip      History of Present Illness    Prior to meeting with the patient I reviewed the medical chart in [facility]. This included reviewing the previous progress notes from our office, review of the patient's last appointment with their primary care provider, review of any visits to the emergency room, and review of any pain management appointments or procedures.   This lady fell about 3 days ago and has had some pain in her left hip is getting better and she is ambulatory but the pain persisted no prior history of any hip problems      Current Medications  Current Outpatient Medications   Medication Sig Dispense Refill    acetaminophen (TYLENOL) 500 MG tablet Take 1,000 mg by mouth every 8 (eight) hours.      amLODIPine (NORVASC) 5 MG tablet Take 1 tablet (5 mg total) by mouth once daily. 90 tablet 1    C,E,zinc,copper 11-omega3s-lut (OCUVITE ADULT 50 PLUS) 250-5-1 mg Cap Take by mouth.      calcium carbonate/vitamin D3 (CALCIUM 600 + D,3, ORAL) Take 1 tablet by mouth once daily.      ELIQUIS 2.5 mg Tab Take 2.5 mg by mouth 2 (two) times daily.      furosemide (LASIX) 20 MG tablet Take 1 tablet (20 mg total) by mouth daily as needed (weight gain >3lbs in 24 hours or >5lbs over 3-5 days; increased SOB). 60 tablet 0    latanoprost 0.005 % ophthalmic solution Place 1 drop into both eyes once daily.      lisinopriL (PRINIVIL,ZESTRIL) 20 MG tablet Take 1 tablet (20 mg total) by mouth once daily. 90 tablet 1    metoprolol tartrate (LOPRESSOR) 25 MG tablet Take 1 tablet (25 mg total) by mouth 2 (two) times a day. 180 tablet 1    multivitamin-min-FA-ginkgo 400-120 mcg-mg Tab take 1 tablet by mouth daily      OXYGEN-AIR DELIVERY SYSTEMS MISC 2 L/min by Nasal route as needed (Shortness of breath).      RESTASIS 0.05 %  ophthalmic emulsion Place 1 drop into both eyes 2 (two) times daily.       simvastatin (ZOCOR) 20 MG tablet Take 1 tablet (20 mg total) by mouth every evening. 90 tablet 1    vitamin B complex (B COMPLEX-VITAMIN B12 ORAL) Take 1,000 mcg by mouth once daily.      vitamins  A,C,E-zinc-copper 7,160 unit- 113 mg-100 unit Tab Take 1 tablet by mouth once daily.       No current facility-administered medications for this visit.       Allergies  Review of patient's allergies indicates:  No Known Allergies    Past Medical History  Past Medical History:   Diagnosis Date    Atrial fibrillation     Hyperlipidemia     Hypertension     Osteoarthritis     Shingles 02/11/2021    completed medicine       Surgical History  Past Surgical History:   Procedure Laterality Date    HYSTERECTOMY         Family History:   Family History   Problem Relation Age of Onset    Hypertension Mother     Kidney disease Mother     Heart disease Father        Social History:   Social History     Socioeconomic History    Marital status:    Tobacco Use    Smoking status: Never Smoker    Smokeless tobacco: Never Used   Substance and Sexual Activity    Alcohol use: Yes     Alcohol/week: 1.0 standard drink     Types: 1 Glasses of wine per week     Comment: Occasional    Drug use: No    Sexual activity: Not Currently       Hospitalization/Major Diagnostic Procedure:     Review of Systems     General/Constitutional:  Chills denies. Fatigue denies. Fever denies. Weight gain denies. Weight loss denies.    Respiratory:  Shortness of breath denies.    Cardiovascular:  Chest pain denies.    Gastrointestinal:  Constipation denies. Diarrhea denies. Nausea denies. Vomiting denies.     Hematology:  Easy bruising denies. Prolonged bleeding denies.     Genitourinary:  Frequent urination denies. Pain in lower back denies. Painful urination denies.     Musculoskeletal:  See HPI for details    Skin:  Rash denies.    Neurologic:  Dizziness denies.  Gait abnormalities denies. Seizures denies. Tingling/Numbess denies.    Psychiatric:  Anxiety denies. Depressed mood denies.     Objective:   Vital Signs: There were no vitals filed for this visit.     Physical Exam      General Examination:     Constitutional: The patient is alert and oriented to lace person and time. Mood is pleasant.     Head/Face: Normal facial features normal eyebrows    Eyes: Normal extraocular motion bilaterally    Lungs: Respirations are equal and unlabored    Gait is coordinated.    Cardiovascular: There are no swelling or varicosities present.    Lymphatic: Negative for adenopathy    Skin: Normal    Neurological: Level of consciousness normal. Oriented to place person and time and situation    Psychiatric: Oriented to time place person and situation    Exam shows mild to moderate tenderness to palpation of the greater trochanter range of motion of the hip is normal with mild pain straight-leg-raising negative no deformity noted    XRAY Report/ Interpretation :  AP pelvis x-ray and lateral left hip x-ray marked osteoarthritis noted both hips left greater than right there is a radiolucent line in the superior lateral aspect of the femoral neck though it is incomplete there is no displacement      Assessment:       1. Primary osteoarthritis of left hip    2. Contusion of left hip, initial encounter    3. Closed fracture of head of left femur, initial encounter        Plan:       Fatimah was seen today for pain and pain.    Diagnoses and all orders for this visit:    Primary osteoarthritis of left hip  -     X-Ray Hip 2 or 3 views Left (with Pelvis when performed)    Contusion of left hip, initial encounter    Closed fracture of head of left femur, initial encounter         Follow up for MRI Results.    Although I doubt and I am concerned this lady may have an incomplete fracture of the femoral neck I feel we are obligated to exclude an acute fracture and therefore we will ordering an MRI study  of the left hip today should the MRI be normal except for osteoarthritis we will release her to begin progressive weight-bearing if the MRI is abnormal she may require stabilization surgically to prevent a complete fracture of the femoral neck this condition was discussed with the patient and the family members they agree with the treatment plan  Treatment options were discussed with regards to the nature of the medical condition. Conservative pain intervention and surgical options were discussed in detail. The probability of success of each separate treatment option was discussed. The patient expressed a clear understanding of the treatment options. With regards to surgery, the procedure risk, benefits, complications, and outcomes were discussed. No guarantees were given with regards to surgical outcome.   The risk of complications, morbidity, and mortality of patient management decisions have been made at the time of this visit. These are associated with the patient's problems, diagnostic procedures and treatment options. This includes the possible management options selected and those considered but not selected by the patient after shared medical decision making we discussed with the patient.   This note was created using Dragon voice recognition software that occasionally misinterpreted phrases or words.

## 2022-07-19 ENCOUNTER — PATIENT MESSAGE (OUTPATIENT)
Dept: FAMILY MEDICINE | Facility: CLINIC | Age: 87
End: 2022-07-19

## 2022-08-01 ENCOUNTER — OFFICE VISIT (OUTPATIENT)
Dept: ORTHOPEDICS | Facility: CLINIC | Age: 87
End: 2022-08-01
Payer: MEDICARE

## 2022-08-01 VITALS — BODY MASS INDEX: 28.66 KG/M2 | WEIGHT: 146 LBS | HEIGHT: 60 IN

## 2022-08-01 DIAGNOSIS — S72.052D CLOSED FRACTURE OF HEAD OF LEFT FEMUR WITH ROUTINE HEALING, SUBSEQUENT ENCOUNTER: Primary | ICD-10-CM

## 2022-08-01 DIAGNOSIS — M16.12 PRIMARY OSTEOARTHRITIS OF LEFT HIP: ICD-10-CM

## 2022-08-01 PROCEDURE — 99213 OFFICE O/P EST LOW 20 MIN: CPT | Mod: S$GLB,,, | Performed by: ORTHOPAEDIC SURGERY

## 2022-08-01 PROCEDURE — 99213 PR OFFICE/OUTPT VISIT, EST, LEVL III, 20-29 MIN: ICD-10-PCS | Mod: S$GLB,,, | Performed by: ORTHOPAEDIC SURGERY

## 2022-08-01 NOTE — PROGRESS NOTES
Subjective:       Patient ID: Fatimah Garber is a 89 y.o. female.    Chief Complaint: Pain of the Left Hip (Left Hip MRI 07/18/22, patient has no pain on the left side.)      History of Present Illness    Prior to meeting with the patient I reviewed the medical chart in [facility]. This included reviewing the previous progress notes from our office, review of the patient's last appointment with their primary care provider, review of any visits to the emergency room, and review of any pain management appointments or procedures.   Patient is here follow-up for a left superior pubic ramus.  Overall patient is doing well with little to no left groin pain.  Prior to the fall patient had a decreased level of functional mobility with only minimal to moderate amount of ambulation on a daily basis.  Patient and her daughter state that she is close to where she was prior to the recent injury.    Current Medications  Current Outpatient Medications   Medication Sig Dispense Refill    acetaminophen (TYLENOL) 500 MG tablet Take 1,000 mg by mouth every 8 (eight) hours.      amLODIPine (NORVASC) 5 MG tablet Take 1 tablet (5 mg total) by mouth once daily. 90 tablet 1    C,E,zinc,copper 11-omega3s-lut (OCUVITE ADULT 50 PLUS) 250-5-1 mg Cap Take by mouth.      calcium carbonate/vitamin D3 (CALCIUM 600 + D,3, ORAL) Take 1 tablet by mouth once daily.      ELIQUIS 2.5 mg Tab Take 2.5 mg by mouth 2 (two) times daily.      furosemide (LASIX) 20 MG tablet Take 1 tablet (20 mg total) by mouth daily as needed (weight gain >3lbs in 24 hours or >5lbs over 3-5 days; increased SOB). 60 tablet 0    latanoprost 0.005 % ophthalmic solution Place 1 drop into both eyes once daily.      lisinopriL (PRINIVIL,ZESTRIL) 20 MG tablet Take 1 tablet (20 mg total) by mouth once daily. 90 tablet 1    metoprolol tartrate (LOPRESSOR) 25 MG tablet Take 1 tablet (25 mg total) by mouth 2 (two) times a day. 180 tablet 1    multivitamin-min-FA-ginkgo 400-120 mcg-mg  Tab take 1 tablet by mouth daily      OXYGEN-AIR DELIVERY SYSTEMS MISC 2 L/min by Nasal route as needed (Shortness of breath).      RESTASIS 0.05 % ophthalmic emulsion Place 1 drop into both eyes 2 (two) times daily.       simvastatin (ZOCOR) 20 MG tablet Take 1 tablet (20 mg total) by mouth every evening. 90 tablet 1    vitamin B complex (B COMPLEX-VITAMIN B12 ORAL) Take 1,000 mcg by mouth once daily.      vitamins  A,C,E-zinc-copper 7,160 unit- 113 mg-100 unit Tab Take 1 tablet by mouth once daily.       No current facility-administered medications for this visit.       Allergies  Review of patient's allergies indicates:  No Known Allergies    Past Medical History  Past Medical History:   Diagnosis Date    Atrial fibrillation     Hyperlipidemia     Hypertension     Osteoarthritis     Shingles 02/11/2021    completed medicine       Surgical History  Past Surgical History:   Procedure Laterality Date    HYSTERECTOMY         Family History:   Family History   Problem Relation Age of Onset    Hypertension Mother     Kidney disease Mother     Heart disease Father        Social History:   Social History     Socioeconomic History    Marital status:    Tobacco Use    Smoking status: Never Smoker    Smokeless tobacco: Never Used   Substance and Sexual Activity    Alcohol use: Yes     Alcohol/week: 1.0 standard drink     Types: 1 Glasses of wine per week     Comment: Occasional    Drug use: No    Sexual activity: Not Currently       Hospitalization/Major Diagnostic Procedure:     Review of Systems     General/Constitutional:  Chills denies. Fatigue denies. Fever denies. Weight gain denies. Weight loss denies.    Respiratory:  Shortness of breath denies.    Cardiovascular:  Chest pain denies.    Gastrointestinal:  Constipation denies. Diarrhea denies. Nausea denies. Vomiting denies.     Hematology:  Easy bruising denies. Prolonged bleeding denies.     Genitourinary:  Frequent urination denies. Pain  in lower back denies. Painful urination denies.     Musculoskeletal:  See HPI for details    Skin:  Rash denies.    Neurologic:  Dizziness denies. Gait abnormalities denies. Seizures denies. Tingling/Numbess denies.    Psychiatric:  Anxiety denies. Depressed mood denies.     Objective:   Vital Signs: There were no vitals filed for this visit.     Physical Exam      General Examination:     Constitutional: The patient is alert and oriented to lace person and time. Mood is pleasant.     Head/Face: Normal facial features normal eyebrows    Eyes: Normal extraocular motion bilaterally    Lungs: Respirations are equal and unlabored    Gait is coordinated.    Cardiovascular: There are no swelling or varicosities present.    Lymphatic: Negative for adenopathy    Skin: Normal    Neurological: Level of consciousness normal. Oriented to place person and time and situation    Psychiatric: Oriented to time place person and situation    Patient presents in a wheelchair.  Bilateral groin pain reproduced with rotation of the hip.    XRAY Report/ Interpretation:  AP pelvis x-ray taken in the office today reviewed the patient demonstrates no significant abnormality except for the bilateral hip degenerative joint disease    Previous left hip MRI demonstrates left superior ramus insufficiency fracture.  Significant bilateral hip degenerative joint disease.      Assessment:       1. Closed fracture of head of left femur with routine healing, subsequent encounter        Plan:       Fatimah was seen today for pain.    Diagnoses and all orders for this visit:    Closed fracture of head of left femur with routine healing, subsequent encounter  -     X-Ray Pelvis Routine AP         No follow-ups on file.  This is to attest that the physician's assistant Garett Cortes served in the capacity as a scribe for this patient's encounter.  This is also to verify that I have reviewed the patient's history and helped formulate the treatment plan and  discussed it with the physician's assistant.  I have actively participated  in the evaluation and treatment plan for this patient visit.  The treatment plan and medical decision-making is as outlined below:  At this time recommend home health physical therapy to work on functional mobility and safety at home.  Patient plans to leave her daughter's house and go back home to live independently over the next couple of weeks.  Follow-up p.r.n..    Treatment options were discussed with regards to the nature of the medical condition. Conservative pain intervention and surgical options were discussed in detail. The probability of success of each separate treatment option was discussed. The patient expressed a clear understanding of the treatment options. With regards to surgery, the procedure risk, benefits, complications, and outcomes were discussed. No guarantees were given with regards to surgical outcome.   The risk of complications, morbidity, and mortality of patient management decisions have been made at the time of this visit. These are associated with the patient's problems, diagnostic procedures and treatment options. This includes the possible management options selected and those considered but not selected by the patient after shared medical decision making we discussed with the patient.     This note was created using Dragon voice recognition software that occasionally misinterpreted phrases or words.

## 2022-08-03 PROCEDURE — G0180 MD CERTIFICATION HHA PATIENT: HCPCS | Mod: ,,, | Performed by: ORTHOPAEDIC SURGERY

## 2022-08-03 PROCEDURE — G0180 PR HOME HEALTH MD CERTIFICATION: ICD-10-PCS | Mod: ,,, | Performed by: ORTHOPAEDIC SURGERY

## 2022-08-09 PROBLEM — I50.43 ACUTE ON CHRONIC COMBINED SYSTOLIC AND DIASTOLIC HEART FAILURE: Status: ACTIVE | Noted: 2022-08-09

## 2022-08-09 PROBLEM — J96.01 ACUTE HYPOXEMIC RESPIRATORY FAILURE: Status: ACTIVE | Noted: 2022-08-09

## 2022-08-12 PROBLEM — J18.9 PNEUMONIA INVOLVING LEFT LUNG: Status: ACTIVE | Noted: 2022-08-12

## 2022-08-12 PROBLEM — K59.00 CONSTIPATION: Status: ACTIVE | Noted: 2022-08-12

## 2022-08-29 ENCOUNTER — OFFICE VISIT (OUTPATIENT)
Dept: FAMILY MEDICINE | Facility: CLINIC | Age: 87
End: 2022-08-29
Payer: MEDICARE

## 2022-08-29 VITALS
RESPIRATION RATE: 18 BRPM | OXYGEN SATURATION: 96 % | DIASTOLIC BLOOD PRESSURE: 50 MMHG | SYSTOLIC BLOOD PRESSURE: 90 MMHG | HEIGHT: 64 IN | HEART RATE: 65 BPM | WEIGHT: 131 LBS | BODY MASS INDEX: 22.36 KG/M2

## 2022-08-29 DIAGNOSIS — I48.19 PERSISTENT ATRIAL FIBRILLATION: Chronic | ICD-10-CM

## 2022-08-29 DIAGNOSIS — E78.2 MIXED HYPERLIPIDEMIA: Chronic | ICD-10-CM

## 2022-08-29 DIAGNOSIS — Z09 HOSPITAL DISCHARGE FOLLOW-UP: ICD-10-CM

## 2022-08-29 DIAGNOSIS — I10 ESSENTIAL HYPERTENSION: Chronic | ICD-10-CM

## 2022-08-29 DIAGNOSIS — I50.32 CHRONIC DIASTOLIC HEART FAILURE: Primary | ICD-10-CM

## 2022-08-29 DIAGNOSIS — J18.9 COMMUNITY ACQUIRED PNEUMONIA OF RIGHT LOWER LOBE OF LUNG: ICD-10-CM

## 2022-08-29 DIAGNOSIS — J90 PLEURAL EFFUSION, LEFT: ICD-10-CM

## 2022-08-29 DIAGNOSIS — I27.21 PULMONARY ARTERIAL HYPERTENSION: ICD-10-CM

## 2022-08-29 DIAGNOSIS — S32.9XXD CLOSED NONDISPLACED FRACTURE OF PELVIS WITH ROUTINE HEALING, UNSPECIFIED PART OF PELVIS, SUBSEQUENT ENCOUNTER: ICD-10-CM

## 2022-08-29 DIAGNOSIS — I10 PRIMARY HYPERTENSION: ICD-10-CM

## 2022-08-29 PROCEDURE — 99495 TCM SERVICES (MODERATE COMPLEXITY): ICD-10-PCS | Mod: S$GLB,,, | Performed by: FAMILY MEDICINE

## 2022-08-29 PROCEDURE — 99495 TRANSJ CARE MGMT MOD F2F 14D: CPT | Mod: S$GLB,,, | Performed by: FAMILY MEDICINE

## 2022-08-29 NOTE — PROGRESS NOTES
SUBJECTIVE:    Patient ID: Fatimah Garber is a 89 y.o. female.    Chief Complaint: Hospital Follow Up (Brought meds list // swollen foot // edema // upcoming ajay with the pulmonologist// abc )    89-year-old female had fallen and sustained a pelvic fracture approximately a month ago.  Three weeks ago she developed significant pedal edema shortness of breath and presented to the emergency room was admitted in congestive heart failure.    Hospital Course:   The patient was admitted for further evaluation and treatment. IV lasix was continued. Cardiology was consulted. TTE shows EF 50% and indeterminate diastolic dysfunction. Patient has diuresed over 6L this admission with dramatic improvement in symptoms. Repeat CXR - Lsided consolidation, trace pleural effusion with increasing WBC. Procal within normal limits. Started on Levaquin on 8/11. With WBC on up trend, Levaquin changed to zosyn + vanco on 8/12. MRSA by PCR +, strep and legionella pending. Pulmo consulted, started saline nebs. No sputum production.Repeat cxr showing enlarging L pleural effusion.  WBC improving.  Patient's Lasix dose was increased, plan repeat chest x-ray if stable discharge home with O2 /or thoracentesis prior to discharge and follow-up as outpatient.  Home O2 evaluation.    Chest x-ray with moderately large left-sided effusion with pneumonia with cavitary changes.  Discussed with Pulmonary will hold Eliquis, plan for thoracentesis for patient is symptomatic with hypoxia/respiratory distress.  Has diuresed 4 kilos since admission-switched to p.o. Lasix..  For thoracentesis in a.m., and discharge home post procedure -if follow up x-ray stable- patient's antibiotics switched to p.o.  For diagnostic and therapeutic thoracentesis today, then possibly discharge her tonight - if clinically stable WITH F/U CXR    Patient had diuresed proximally 6 L or 20 lb during her hospital stay.  She was sent home on 40 of Lasix once a day.  Home health nurses  called reported a 6 lb weight gain.  She was increased to 40 mg twice a day of Lasix x5 days and is diuresing again.  She feels comfortable and use the oxygen 2 L nasal cannula at home.    Dr. Garcia pulmonology had done thoracentesis to the left lung and tapped 600 cc out.    Her appetite is good, she uses a Rollator to ambulate at home.  Her pelvic pain is 0-1/10 and she is only using Tylenol for pain.  She is mobile.         Admit Date: 8/9/22   Discharge Date: 8/18/22  Discharge Facility: Hospital    Medication Reconciliation:  Medications changed/added/deleted.  Lasix 40 mg daily  New Prescriptions filled after discharge: yes  Discharge summary reviewed:  yes  Pending test results at discharge reviewed:   yes  Follow up appointments scheduled:  yes              with Cardiology   Follow up labs/tests ordered:   yes  Home Health ordered on discharge:   yes  Home Health company name:  [facility]  DME ordered at discharge:   yes  How patient is feeling since discharge from the hospital?  Patient is diuresing nicely and feels better.     Patient follow up phone call documented on separate encounter.      No results displayed because visit has over 200 results.          Past Medical History:   Diagnosis Date    Atrial fibrillation     Hyperlipidemia     Hypertension     Osteoarthritis     Shingles 02/11/2021    completed medicine     Past Surgical History:   Procedure Laterality Date    HYSTERECTOMY       Family History   Problem Relation Age of Onset    Hypertension Mother     Kidney disease Mother     Heart disease Father        Marital Status:   Alcohol History:  reports current alcohol use of about 1.0 standard drink per week.  Tobacco History:  reports that she has never smoked. She has never used smokeless tobacco.  Drug History:  reports no history of drug use.    Review of patient's allergies indicates:  No Known Allergies    Current Outpatient Medications:     acetaminophen  (TYLENOL) 500 MG tablet, Take 1,000 mg by mouth every 8 (eight) hours., Disp: , Rfl:     amLODIPine (NORVASC) 5 MG tablet, Take 1 tablet (5 mg total) by mouth once daily., Disp: 90 tablet, Rfl: 1    C,E,zinc,copper 11-omega3s-lut (OCUVITE ADULT 50 PLUS) 250-5-1 mg Cap, Take 1 capsule by mouth Daily., Disp: , Rfl:     calcium carbonate/vitamin D3 (CALCIUM 600 + D,3, ORAL), Take 1 tablet by mouth once daily., Disp: , Rfl:     docusate sodium (COLACE) 100 MG capsule, Take 1 capsule (100 mg total) by mouth 2 (two) times daily., Disp: 60 capsule, Rfl: 1    doxycycline (VIBRA-TABS) 100 MG tablet, Take 1 tablet (100 mg total) by mouth every 12 (twelve) hours. (Patient taking differently: Take 100 mg by mouth every 12 (twelve) hours. stop on WEd 8/24/22), Disp: 12 tablet, Rfl: 0    ELIQUIS 2.5 mg Tab, Take 2.5 mg by mouth 2 (two) times daily., Disp: , Rfl:     furosemide (LASIX) 20 MG tablet, Take 2 tablets (40 mg total) by mouth once daily., Disp: 60 tablet, Rfl: 11    ipratropium-albuteroL (COMBIVENT)  mcg/actuation inhaler, Inhale 1 puff into the lungs every 6 (six) hours as needed for Wheezing. Rescue, Disp: 1 each, Rfl: 2    Lactobacillus rhamnosus GG (CULTURELLE) 10 billion cell capsule, Take 1 capsule by mouth once daily., Disp: 30 capsule, Rfl: 0    latanoprost 0.005 % ophthalmic solution, Place 1 drop into both eyes once daily., Disp: , Rfl:     lisinopriL (PRINIVIL,ZESTRIL) 20 MG tablet, Take 1 tablet (20 mg total) by mouth once daily., Disp: 90 tablet, Rfl: 1    metoprolol tartrate (LOPRESSOR) 25 MG tablet, Take 1 tablet (25 mg total) by mouth 2 (two) times a day., Disp: 180 tablet, Rfl: 1    multivitamin-min-FA-ginkgo 400-120 mcg-mg Tab, take 1 tablet by mouth daily, Disp: , Rfl:     OXYGEN-AIR DELIVERY SYSTEMS MISC, 2 L/min by Nasal route as needed (Shortness of breath)., Disp: , Rfl:     potassium chloride SA (K-DUR,KLOR-CON) 20 MEQ tablet, Take 1 tablet (20 mEq total) by mouth once daily., Disp: 30  "tablet, Rfl: 0    RESTASIS 0.05 % ophthalmic emulsion, Place 1 drop into both eyes 2 (two) times daily. , Disp: , Rfl:     simvastatin (ZOCOR) 20 MG tablet, Take 1 tablet (20 mg total) by mouth every evening., Disp: 90 tablet, Rfl: 1    vitamin B complex (B COMPLEX-VITAMIN B12 ORAL), Take 1,000 mcg by mouth once daily., Disp: , Rfl:     vitamins  A,C,E-zinc-copper 7,160 unit- 113 mg-100 unit Tab, Take 1 tablet by mouth once daily., Disp: , Rfl:     Review of Systems   Constitutional:  Negative for appetite change, chills, fatigue, fever and unexpected weight change.   HENT:  Negative for congestion, ear pain, sinus pain, sore throat and trouble swallowing.    Eyes:  Negative for pain, discharge and visual disturbance.   Respiratory:  Positive for shortness of breath. Negative for apnea, cough and wheezing.    Cardiovascular:  Positive for leg swelling (edema is subsiding). Negative for chest pain and palpitations.   Gastrointestinal:  Negative for abdominal pain, blood in stool, constipation, diarrhea, nausea and vomiting.   Endocrine: Negative for heat intolerance, polydipsia and polyuria.   Genitourinary:  Negative for difficulty urinating, dyspareunia, dysuria, frequency, hematuria and menstrual problem.   Musculoskeletal:  Negative for arthralgias, back pain, gait problem, joint swelling and myalgias.        History of pelvic fracture   Allergic/Immunologic: Negative for environmental allergies, food allergies and immunocompromised state.   Neurological:  Negative for dizziness, tremors, seizures, numbness and headaches.   Psychiatric/Behavioral:  Negative for behavioral problems, confusion, hallucinations and suicidal ideas. The patient is not nervous/anxious.       Objective:      Vitals:    08/29/22 1151   BP: (!) 90/50   Pulse: 65   Resp: 18   SpO2: 96%   Weight: 59.4 kg (131 lb)   Height: 5' 4" (1.626 m)     Physical Exam  Vitals and nursing note reviewed.   Constitutional:       General: She is not in " acute distress.     Appearance: She is well-developed. She is ill-appearing. She is not toxic-appearing.      Comments: Frail elderly female sitting in a wheelchair   HENT:      Head: Normocephalic and atraumatic.      Right Ear: Tympanic membrane and external ear normal.      Left Ear: Tympanic membrane and external ear normal.      Nose: Nose normal.      Mouth/Throat:      Pharynx: Oropharynx is clear.   Eyes:      Pupils: Pupils are equal, round, and reactive to light.   Neck:      Thyroid: No thyromegaly.      Vascular: No carotid bruit.   Cardiovascular:      Rate and Rhythm: Normal rate. Rhythm irregular.      Heart sounds: Normal heart sounds. No murmur heard.  Pulmonary:      Effort: Pulmonary effort is normal.      Breath sounds: No wheezing or rales.      Comments: Decreased breath sounds in the right base  Abdominal:      General: Bowel sounds are normal. There is no distension.      Palpations: Abdomen is soft.      Tenderness: There is no abdominal tenderness.   Musculoskeletal:         General: No tenderness or deformity. Normal range of motion.      Cervical back: Normal range of motion and neck supple.      Lumbar back: Normal. No spasms.      Right lower leg: Edema (1+ pitting edema up to the knee) present.      Left lower leg: Edema (1+ pitting edema up to the knee) present.      Comments: Bends 90 degrees at  waist   Lymphadenopathy:      Cervical: No cervical adenopathy.   Skin:     General: Skin is warm and dry.      Findings: No rash.   Neurological:      Mental Status: She is alert and oriented to person, place, and time.      Cranial Nerves: No cranial nerve deficit.      Motor: Weakness present.      Coordination: Coordination normal.      Gait: Gait abnormal.   Psychiatric:         Mood and Affect: Mood normal.         Behavior: Behavior normal.         Thought Content: Thought content normal.         Judgment: Judgment normal.       Assessment:       1. Chronic diastolic heart failure     2. Community acquired pneumonia of right lower lobe of lung    3. Pulmonary arterial hypertension    4. Pleural effusion, left    5. Persistent atrial fibrillation    6. Essential hypertension    7. Mixed hyperlipidemia    8. Primary hypertension    9. Closed nondisplaced fracture of pelvis with routine healing, unspecified part of pelvis, subsequent encounter    10. Hospital discharge follow-up         Plan:       Chronic diastolic heart failure  Will continue Lasix 40 mg daily.  If gains weight 3 6 lb, then increase to 40 mg b.i.d. Lasix.  Chest x-ray and BMP are pending this week with home health nursing  Community acquired pneumonia of right lower lobe of lung  She completed Levaquin  Pulmonary arterial hypertension    Pleural effusion, left  Status post 600 cc thoracentesis at the hospital  Persistent atrial fibrillation    Essential hypertension    Mixed hyperlipidemia    Primary hypertension  Blood pressure running somewhat low  Closed nondisplaced fracture of pelvis with routine healing, unspecified part of pelvis, subsequent encounter    Hospital discharge follow-up  Hospital medications reconciled home medications.  Follow up in about 4 months (around 12/29/2022), or CHF.

## 2022-08-30 ENCOUNTER — EXTERNAL HOME HEALTH (OUTPATIENT)
Dept: HOME HEALTH SERVICES | Facility: HOSPITAL | Age: 87
End: 2022-08-30
Payer: MEDICARE

## 2022-08-31 RX ORDER — FUROSEMIDE 20 MG/1
40 TABLET ORAL DAILY
Qty: 60 TABLET | Refills: 3 | Status: SHIPPED | OUTPATIENT
Start: 2022-08-31 | End: 2023-03-01 | Stop reason: SDUPTHER

## 2022-09-09 ENCOUNTER — TELEPHONE (OUTPATIENT)
Dept: FAMILY MEDICINE | Facility: CLINIC | Age: 87
End: 2022-09-09

## 2022-09-09 DIAGNOSIS — I50.43 ACUTE ON CHRONIC COMBINED SYSTOLIC AND DIASTOLIC HEART FAILURE: ICD-10-CM

## 2022-09-09 RX ORDER — POTASSIUM CHLORIDE 20 MEQ/1
20 TABLET, EXTENDED RELEASE ORAL 2 TIMES DAILY
Qty: 180 TABLET | Refills: 1 | Status: SHIPPED | OUTPATIENT
Start: 2022-09-09 | End: 2023-03-01 | Stop reason: SDUPTHER

## 2022-09-09 RX ORDER — DOCUSATE SODIUM 100 MG/1
100 CAPSULE, LIQUID FILLED ORAL 2 TIMES DAILY
Qty: 180 CAPSULE | Refills: 1 | Status: SHIPPED | OUTPATIENT
Start: 2022-09-09

## 2022-09-09 NOTE — TELEPHONE ENCOUNTER
----- Message from Corrine Hollins sent at 9/9/2022  9:09 AM CDT -----  Patient daughter (Elisa) called and stated that the patient was given some new  medicine in the hospital and she need to have a new prescription sent to the [facility]  mail order the name of the medicine potassium chloride and her docusate sodium if any questions please give her a call at [phone]

## 2022-09-09 NOTE — TELEPHONE ENCOUNTER
Spoke with pts daughter, states she is taking 20 MEQ of Potassium once daily. Stool softener twice daily.     There is some confusion on the potassium as the notes say 40MEQ. Daughter has a note that says to increase lasix to twice daily if weight gain, do you want her increasing potassium in that event also or just stay on 20meq daily?    Quantity left blank on rx so Dr. Mancera can fill in correct amount for patient.

## 2022-09-09 NOTE — TELEPHONE ENCOUNTER
Call daughter,I sent potassium 20 meq twice a day to pharm, if she has only been on once a day, just stay once a day

## 2022-11-14 PROBLEM — J18.9 PNEUMONIA INVOLVING LEFT LUNG: Status: RESOLVED | Noted: 2022-08-12 | Resolved: 2022-11-14

## 2022-11-14 PROBLEM — J96.01 ACUTE HYPOXEMIC RESPIRATORY FAILURE: Status: RESOLVED | Noted: 2022-08-09 | Resolved: 2022-11-14

## 2023-01-10 ENCOUNTER — TELEPHONE (OUTPATIENT)
Dept: FAMILY MEDICINE | Facility: CLINIC | Age: 88
End: 2023-01-10

## 2023-01-10 DIAGNOSIS — Z79.899 ENCOUNTER FOR LONG-TERM (CURRENT) USE OF OTHER MEDICATIONS: Primary | ICD-10-CM

## 2023-01-10 DIAGNOSIS — E78.2 MIXED HYPERLIPIDEMIA: ICD-10-CM

## 2023-01-10 DIAGNOSIS — I10 ESSENTIAL HYPERTENSION: ICD-10-CM

## 2023-01-10 NOTE — TELEPHONE ENCOUNTER
Spoke with patient and let know that we don't have anything earlier that date, states she is okay with keeping the appointment. Aware labs will be at [facility] to be done any time as long as she is fasting.

## 2023-01-17 ENCOUNTER — OFFICE VISIT (OUTPATIENT)
Dept: FAMILY MEDICINE | Facility: CLINIC | Age: 88
End: 2023-01-17
Payer: MEDICARE

## 2023-01-17 VITALS
HEIGHT: 64 IN | WEIGHT: 133 LBS | DIASTOLIC BLOOD PRESSURE: 80 MMHG | BODY MASS INDEX: 22.71 KG/M2 | HEART RATE: 72 BPM | SYSTOLIC BLOOD PRESSURE: 128 MMHG

## 2023-01-17 DIAGNOSIS — M16.12 PRIMARY OSTEOARTHRITIS OF LEFT HIP: Chronic | ICD-10-CM

## 2023-01-17 DIAGNOSIS — I48.0 PAROXYSMAL ATRIAL FIBRILLATION: ICD-10-CM

## 2023-01-17 DIAGNOSIS — I50.32 CHRONIC DIASTOLIC HEART FAILURE: Primary | ICD-10-CM

## 2023-01-17 DIAGNOSIS — D51.0 PERNICIOUS ANEMIA: ICD-10-CM

## 2023-01-17 DIAGNOSIS — M81.0 AGE-RELATED OSTEOPOROSIS WITHOUT CURRENT PATHOLOGICAL FRACTURE: ICD-10-CM

## 2023-01-17 DIAGNOSIS — E78.2 MIXED HYPERLIPIDEMIA: Chronic | ICD-10-CM

## 2023-01-17 DIAGNOSIS — I10 ESSENTIAL HYPERTENSION: Chronic | ICD-10-CM

## 2023-01-17 DIAGNOSIS — I10 PRIMARY HYPERTENSION: ICD-10-CM

## 2023-01-17 PROCEDURE — 99214 OFFICE O/P EST MOD 30 MIN: CPT | Mod: S$GLB,,, | Performed by: FAMILY MEDICINE

## 2023-01-17 PROCEDURE — 99214 PR OFFICE/OUTPT VISIT, EST, LEVL IV, 30-39 MIN: ICD-10-PCS | Mod: S$GLB,,, | Performed by: FAMILY MEDICINE

## 2023-01-17 RX ORDER — LISINOPRIL 20 MG/1
20 TABLET ORAL DAILY
Qty: 90 TABLET | Refills: 3 | Status: CANCELLED | OUTPATIENT
Start: 2023-01-17

## 2023-01-17 RX ORDER — DOCUSATE SODIUM 100 MG/1
100 CAPSULE, LIQUID FILLED ORAL 2 TIMES DAILY
Qty: 180 CAPSULE | Refills: 1 | Status: CANCELLED | OUTPATIENT
Start: 2023-01-17

## 2023-01-17 RX ORDER — FUROSEMIDE 20 MG/1
40 TABLET ORAL DAILY
Qty: 60 TABLET | Refills: 3 | Status: CANCELLED | OUTPATIENT
Start: 2023-01-17 | End: 2024-01-17

## 2023-01-17 RX ORDER — SIMVASTATIN 20 MG/1
20 TABLET, FILM COATED ORAL NIGHTLY
Qty: 90 TABLET | Refills: 3 | Status: CANCELLED | OUTPATIENT
Start: 2023-01-17

## 2023-01-17 RX ORDER — METOPROLOL TARTRATE 25 MG/1
25 TABLET, FILM COATED ORAL 2 TIMES DAILY
Qty: 180 TABLET | Refills: 3 | Status: CANCELLED | OUTPATIENT
Start: 2023-01-17

## 2023-01-17 RX ORDER — AMLODIPINE BESYLATE 5 MG/1
5 TABLET ORAL DAILY
Qty: 90 TABLET | Refills: 3 | Status: CANCELLED | OUTPATIENT
Start: 2023-01-17

## 2023-01-17 NOTE — PROGRESS NOTES
SUBJECTIVE:    Patient ID: Fatimah Garber is a 89 y.o. female.    Chief Complaint: Follow-up (No bottles, need refills, abc )    89-year-old female here for six-month checkup.  She had a hospital admission for congestive heart failure back in August but is doing very well since that time.  Her Lasix was increased to 40 mg daily.  She wears oxygen 2 L nasal cannula every night.  Her leg edema is well-controlled with Lasix.    She lives alone but is checked on by her family very often.  She eats 3 meals a day and often cooks her own meal and eats a salad.  She uses a Rollator for ambulation at home and has had no recent falls.  She weighs herself daily.    Pulmonary appointment-Chet Buenrostro nurse practitioner    Cardiology Dr. Meza appointment in February 7th, labs due then as well.  Chronic atrial fibrillation, managed with Eliquis 2.5 mg b.i.d.    She had a case of the shingles in 2021 and now wants the shingles vaccine.    Lab Visit on 08/30/2022   Component Date Value Ref Range Status    Sodium 08/30/2022 135 (L)  136 - 145 mmol/L Final    Potassium 08/30/2022 4.3  3.5 - 5.1 mmol/L Final    Chloride 08/30/2022 94 (L)  95 - 110 mmol/L Final    CO2 08/30/2022 31  22 - 31 mmol/L Final    Glucose 08/30/2022 90  70 - 110 mg/dL Final    BUN 08/30/2022 19 (H)  7 - 18 mg/dL Final    Creatinine 08/30/2022 0.74  0.50 - 1.40 mg/dL Final    Calcium 08/30/2022 9.5  8.4 - 10.2 mg/dL Final    Anion Gap 08/30/2022 10  8 - 16 mmol/L Final    eGFR 08/30/2022 >60  >60 mL/min/1.73 m^2 Final   No results displayed because visit has over 200 results.          Past Medical History:   Diagnosis Date    Atrial fibrillation     Hyperlipidemia     Hypertension     Osteoarthritis     Shingles 02/11/2021    completed medicine     Social History     Socioeconomic History    Marital status:    Tobacco Use    Smoking status: Never    Smokeless tobacco: Never   Substance and Sexual Activity    Alcohol use: Yes     Alcohol/week: 1.0  standard drink     Types: 1 Glasses of wine per week     Comment: Occasional    Drug use: No    Sexual activity: Not Currently     Social Determinants of Health     Financial Resource Strain: Low Risk     Difficulty of Paying Living Expenses: Not hard at all   Food Insecurity: No Food Insecurity    Worried About Running Out of Food in the Last Year: Never true    Ran Out of Food in the Last Year: Never true   Transportation Needs: No Transportation Needs    Lack of Transportation (Medical): No    Lack of Transportation (Non-Medical): No   Physical Activity: Inactive    Days of Exercise per Week: 0 days    Minutes of Exercise per Session: 0 min   Stress: No Stress Concern Present    Feeling of Stress : Not at all   Social Connections: Socially Isolated    Frequency of Communication with Friends and Family: More than three times a week    Frequency of Social Gatherings with Friends and Family: More than three times a week    Attends [employer] Services: Never    Active Member of Clubs or Organizations: No    Attends Club or Organization Meetings: Never    Marital Status:    Housing Stability: Low Risk     Unable to Pay for Housing in the Last Year: No    Number of Places Lived in the Last Year: 1    Unstable Housing in the Last Year: No     Past Surgical History:   Procedure Laterality Date    HYSTERECTOMY       Family History   Problem Relation Age of Onset    Hypertension Mother     Kidney disease Mother     Heart disease Father        Review of patient's allergies indicates:  No Known Allergies    Current Outpatient Medications:     acetaminophen (TYLENOL) 500 MG tablet, Take 1,000 mg by mouth every 8 (eight) hours., Disp: , Rfl:     amLODIPine (NORVASC) 5 MG tablet, Take 1 tablet (5 mg total) by mouth once daily., Disp: 90 tablet, Rfl: 1    C,E,zinc,copper 11-omega3s-lut (OCUVITE ADULT 50 PLUS) 250-5-1 mg Cap, Take 1 capsule by mouth Daily., Disp: , Rfl:     calcium carbonate/vitamin D3 (CALCIUM 600 + D,3,  ORAL), Take 1 tablet by mouth once daily., Disp: , Rfl:     docusate sodium (COLACE) 100 MG capsule, Take 1 capsule (100 mg total) by mouth 2 (two) times daily., Disp: 180 capsule, Rfl: 1    ELIQUIS 2.5 mg Tab, Take 2.5 mg by mouth 2 (two) times daily., Disp: , Rfl:     furosemide (LASIX) 20 MG tablet, Take 2 tablets (40 mg total) by mouth once daily. increase to 40mg 2x/day x 5 days for 6+lb weight gain take additional dose of 40mg for 3lb weight gain in 24hrs., Disp: 60 tablet, Rfl: 3    ipratropium-albuteroL (COMBIVENT)  mcg/actuation inhaler, Inhale 1 puff into the lungs every 6 (six) hours as needed for Wheezing. Rescue, Disp: 1 each, Rfl: 2    Lactobacillus rhamnosus GG (CULTURELLE) 10 billion cell capsule, Take 1 capsule by mouth once daily., Disp: 30 capsule, Rfl: 0    latanoprost 0.005 % ophthalmic solution, Place 1 drop into both eyes once daily., Disp: , Rfl:     lisinopriL (PRINIVIL,ZESTRIL) 20 MG tablet, Take 1 tablet (20 mg total) by mouth once daily., Disp: 90 tablet, Rfl: 1    metoprolol tartrate (LOPRESSOR) 25 MG tablet, Take 1 tablet (25 mg total) by mouth 2 (two) times a day., Disp: 180 tablet, Rfl: 1    multivitamin-min-FA-ginkgo 400-120 mcg-mg Tab, take 1 tablet by mouth daily, Disp: , Rfl:     OXYGEN-AIR DELIVERY SYSTEMS MISC, 2 L/min by Nasal route as needed (Shortness of breath)., Disp: , Rfl:     potassium chloride SA (K-DUR,KLOR-CON) 20 MEQ tablet, Take 1 tablet (20 mEq total) by mouth 2 (two) times daily., Disp: 180 tablet, Rfl: 1    RESTASIS 0.05 % ophthalmic emulsion, Place 1 drop into both eyes 2 (two) times daily. , Disp: , Rfl:     simvastatin (ZOCOR) 20 MG tablet, Take 1 tablet (20 mg total) by mouth every evening., Disp: 90 tablet, Rfl: 1    vitamin B complex (B COMPLEX-VITAMIN B12 ORAL), Take 1,000 mcg by mouth once daily., Disp: , Rfl:     vitamins  A,C,E-zinc-copper 7,160 unit- 113 mg-100 unit Tab, Take 1 tablet by mouth once daily., Disp: , Rfl:     doxycycline  "(VIBRA-TABS) 100 MG tablet, Take 1 tablet (100 mg total) by mouth every 12 (twelve) hours. (Patient taking differently: Take 100 mg by mouth every 12 (twelve) hours. stop on WEd 8/24/22), Disp: 12 tablet, Rfl: 0    Review of Systems   Constitutional:  Negative for appetite change, chills, fatigue, fever and unexpected weight change.   HENT:  Negative for congestion, ear pain, sinus pain, sore throat and trouble swallowing.    Eyes:  Negative for pain, discharge and visual disturbance.   Respiratory:  Negative for apnea, cough, shortness of breath and wheezing.    Cardiovascular:  Negative for chest pain, palpitations and leg swelling.   Gastrointestinal:  Positive for constipation (stool softeners are  effective). Negative for abdominal pain, blood in stool, diarrhea, nausea and vomiting.   Endocrine: Negative for heat intolerance, polydipsia and polyuria.   Genitourinary:  Negative for difficulty urinating, dyspareunia, dysuria, frequency, hematuria and menstrual problem.        Nocturia 2 x  nite   Musculoskeletal:  Positive for gait problem. Negative for arthralgias, back pain, joint swelling and myalgias.   Allergic/Immunologic: Negative for environmental allergies, food allergies and immunocompromised state.   Neurological:  Negative for dizziness, tremors, seizures, numbness and headaches.   Psychiatric/Behavioral:  Positive for sleep disturbance. Negative for behavioral problems, confusion, hallucinations and suicidal ideas. The patient is not nervous/anxious.         Objective:      Vitals:    01/17/23 1458   BP: 128/80   Pulse: 72   Weight: 60.3 kg (133 lb)   Height: 5' 4" (1.626 m)     Physical Exam  Vitals and nursing note reviewed.   Constitutional:       General: She is not in acute distress.     Appearance: Normal appearance. She is well-developed. She is not toxic-appearing.   HENT:      Head: Normocephalic and atraumatic.      Right Ear: Tympanic membrane and external ear normal.      Left Ear: " Tympanic membrane and external ear normal.      Nose: Nose normal.      Mouth/Throat:      Pharynx: Oropharynx is clear.   Eyes:      Pupils: Pupils are equal, round, and reactive to light.   Neck:      Thyroid: No thyromegaly.      Vascular: No carotid bruit.   Cardiovascular:      Rate and Rhythm: Normal rate. Rhythm irregular.      Heart sounds: Normal heart sounds. No murmur heard.     Comments: Atrial fibrillation rate controlled.  Pulmonary:      Effort: Pulmonary effort is normal.      Breath sounds: Normal breath sounds. No wheezing or rales.   Abdominal:      General: Bowel sounds are normal. There is no distension.      Palpations: Abdomen is soft.      Tenderness: There is no abdominal tenderness.   Musculoskeletal:         General: No tenderness or deformity. Normal range of motion.      Cervical back: Normal range of motion and neck supple.      Lumbar back: Normal. No spasms.      Comments: Bends 60 degrees at  waist,knees very crepitant, trace  edema to legs   Lymphadenopathy:      Cervical: No cervical adenopathy.   Skin:     General: Skin is warm and dry.      Findings: No rash.      Comments: Stasis dermatitis   Neurological:      Mental Status: She is alert and oriented to person, place, and time. Mental status is at baseline.      Cranial Nerves: No cranial nerve deficit.      Coordination: Coordination normal.      Gait: Gait abnormal.   Psychiatric:         Mood and Affect: Mood normal.         Behavior: Behavior normal.         Thought Content: Thought content normal.         Judgment: Judgment normal.         Assessment:       1. Chronic diastolic heart failure    2. Essential hypertension    3. Paroxysmal atrial fibrillation    4. Mixed hyperlipidemia    5. Primary hypertension    6. Pernicious anemia    7. Age-related osteoporosis without current pathological fracture    8. Primary osteoarthritis of left hip           Plan:       Chronic diastolic heart failure  Patient has well compensated  heart failure.  Only has trace edema in the ankles.  Essential hypertension  Continue current medications blood pressure well controlled  Paroxysmal atrial fibrillation  Patient has rate controlled AFib, asymptomatic  Mixed hyperlipidemia  Labs due in February  Primary hypertension    Pernicious anemia  Labs in February  Age-related osteoporosis without current pathological fracture  Continue calcium plus D vitamins  Primary osteoarthritis of left hip  Recommend Shingrix vaccine this spring    Follow up in about 6 months (around 7/17/2023).        1/18/2023 Garett Mancera

## 2023-02-06 PROBLEM — J96.11 CHRONIC HYPOXEMIC RESPIRATORY FAILURE: Status: ACTIVE | Noted: 2023-02-06

## 2023-02-15 ENCOUNTER — TELEPHONE (OUTPATIENT)
Dept: FAMILY MEDICINE | Facility: CLINIC | Age: 88
End: 2023-02-15

## 2023-02-15 NOTE — TELEPHONE ENCOUNTER
----- Message from RT Ronnell sent at 2/13/2023  8:12 AM CST -----    ----- Message -----  From: Felipa Haddad MA  Sent: 2/13/2023  12:00 AM CST  To: Garett Mancera Staff    Per Dr. Mancera - create a remind me to request Dr. Derrick ko after 2/7

## 2023-03-01 DIAGNOSIS — I50.43 ACUTE ON CHRONIC COMBINED SYSTOLIC AND DIASTOLIC HEART FAILURE: ICD-10-CM

## 2023-03-01 RX ORDER — POTASSIUM CHLORIDE 20 MEQ/1
20 TABLET, EXTENDED RELEASE ORAL 2 TIMES DAILY
Qty: 180 TABLET | Refills: 1 | Status: SHIPPED | OUTPATIENT
Start: 2023-03-01 | End: 2023-12-26 | Stop reason: SDUPTHER

## 2023-03-01 RX ORDER — FUROSEMIDE 20 MG/1
40 TABLET ORAL DAILY
Qty: 360 TABLET | Refills: 1 | Status: SHIPPED | OUTPATIENT
Start: 2023-03-01 | End: 2023-12-10 | Stop reason: DRUGHIGH

## 2023-03-01 NOTE — TELEPHONE ENCOUNTER
----- Message from Corrine Hollins sent at 3/1/2023  9:47 AM CST -----  Patient called and stated that she need a refill of her furosemide and her potassium chloride SA called into [facility] if any questions please give her a call at [phone]

## 2023-05-08 PROBLEM — J96.11 CHRONIC HYPOXEMIC RESPIRATORY FAILURE: Status: RESOLVED | Noted: 2023-02-06 | Resolved: 2023-05-08

## 2023-07-24 ENCOUNTER — TELEPHONE (OUTPATIENT)
Dept: FAMILY MEDICINE | Facility: CLINIC | Age: 88
End: 2023-07-24

## 2023-07-24 NOTE — TELEPHONE ENCOUNTER
----- Message from Carleen Orlando MA sent at 7/24/2023  9:01 AM CDT -----  [phone]  PT called and canceled her appt. for today 07/24/23@10:40 am she stated that she wouldn't be able to make this visit due to transportation  issues ,she's asking that she be rescheduled on a Monday and asking that someone call when the new date slot is assigned .  So she can call for transportation

## 2023-07-27 ENCOUNTER — TELEPHONE (OUTPATIENT)
Dept: FAMILY MEDICINE | Facility: CLINIC | Age: 88
End: 2023-07-27

## 2023-12-10 PROBLEM — I87.2 VENOUS STASIS DERMATITIS OF BOTH LOWER EXTREMITIES: Status: ACTIVE | Noted: 2023-12-10

## 2023-12-12 ENCOUNTER — TELEPHONE (OUTPATIENT)
Dept: FAMILY MEDICINE | Facility: CLINIC | Age: 88
End: 2023-12-12

## 2023-12-12 NOTE — TELEPHONE ENCOUNTER
----- Message from Ivette Pitt sent at 12/12/2023  9:59 AM CST -----  Summer from [facility]. She is getting discharged today. She needs a HFU with in a week. Summer # [phone] pt's # [phone] GH

## 2023-12-12 NOTE — TELEPHONE ENCOUNTER
----- Message from Margarita Escalante sent at 12/12/2023  9:39 AM CST -----  Contact: [facility]  Pt will be DC'D from [facility] today and promise a hospital follow up appt. Pt #[phone]

## 2023-12-13 ENCOUNTER — PATIENT OUTREACH (OUTPATIENT)
Dept: FAMILY MEDICINE | Facility: CLINIC | Age: 88
End: 2023-12-13

## 2023-12-13 ENCOUNTER — TELEPHONE (OUTPATIENT)
Dept: FAMILY MEDICINE | Facility: CLINIC | Age: 88
End: 2023-12-13

## 2023-12-13 NOTE — TELEPHONE ENCOUNTER
----- Message from Felipa Haddad MA sent at 12/12/2023  9:44 AM CST -----  ----- Message from Margarita Escalante sent at 12/12/2023  9:39 AM CST -----  Contact: [facility]  Pt will be DC'D from [facility] today and promise a hospital follow up appt. Pt #[phone]

## 2023-12-13 NOTE — TELEPHONE ENCOUNTER
Spoke with patients daughter, states she is going to wound care, overall doing well. They increased her lasix to 60mg daily and d/c her Amlodipine. States she doesn't feel like she needs anything from us at this time. I did offer a few appointments next week but daughter states she refused, states she has too much going on next week. Will come in on 12/26

## 2023-12-13 NOTE — PROGRESS NOTES
Discharge Information     Discharge Date:   12/12/23    Primary Discharge Diagnosis:  edema    Discharge Summary:  Reviewed      Medication & Order Review     Were medication changes made or new medications added?   Yes    If so, has the patient filled the prescriptions?  Yes     Was Home Health ordered? No    If so, has Home Health contacted patient and/or initiated services?  No    Name of Home Health Agency? N/A    Durable Medical Equipment ordered?  No     If so, has the DME provider contacted patient and delivered equipment?  N/A    Follow Up               Any problems since discharge? No    How is the patient feeling since returning home?      Have you set up recommended follow up appointments?  (cardiology, surgery, etc.)    Schedule Hospital Follow-up appointment within 7-14 days (preferably 7).      Notes:         Spoke with patients daughter, states she is going to wound care, overall doing well. They increased her lasix to 60mg daily and d/c her Amlodipine. States she doesn't feel like she needs anything from us at this time. I did offer a few appointments next week but daughter states she refused, states she has too much going on next week. Will come in on 12/26         Felipa Haddad

## 2023-12-14 ENCOUNTER — TELEPHONE (OUTPATIENT)
Dept: FAMILY MEDICINE | Facility: CLINIC | Age: 88
End: 2023-12-14

## 2023-12-26 ENCOUNTER — TELEPHONE (OUTPATIENT)
Dept: FAMILY MEDICINE | Facility: CLINIC | Age: 88
End: 2023-12-26

## 2023-12-26 ENCOUNTER — OFFICE VISIT (OUTPATIENT)
Dept: FAMILY MEDICINE | Facility: CLINIC | Age: 88
End: 2023-12-26
Payer: MEDICARE

## 2023-12-26 DIAGNOSIS — I50.32 CHRONIC DIASTOLIC HEART FAILURE: ICD-10-CM

## 2023-12-26 DIAGNOSIS — I48.20 CHRONIC ATRIAL FIBRILLATION: ICD-10-CM

## 2023-12-26 DIAGNOSIS — I87.2 VENOUS STASIS DERMATITIS OF BOTH LOWER EXTREMITIES: Primary | ICD-10-CM

## 2023-12-26 DIAGNOSIS — I48.0 PAROXYSMAL ATRIAL FIBRILLATION: ICD-10-CM

## 2023-12-26 DIAGNOSIS — R54 ADVANCED AGE: ICD-10-CM

## 2023-12-26 DIAGNOSIS — Z09 HOSPITAL DISCHARGE FOLLOW-UP: ICD-10-CM

## 2023-12-26 DIAGNOSIS — J96.11 CHRONIC RESPIRATORY FAILURE WITH HYPOXIA: ICD-10-CM

## 2023-12-26 DIAGNOSIS — I50.43 ACUTE ON CHRONIC COMBINED SYSTOLIC AND DIASTOLIC HEART FAILURE: ICD-10-CM

## 2023-12-26 DIAGNOSIS — Z79.01 CHRONIC ANTICOAGULATION: ICD-10-CM

## 2023-12-26 DIAGNOSIS — M16.12 PRIMARY OSTEOARTHRITIS OF LEFT HIP: Chronic | ICD-10-CM

## 2023-12-26 DIAGNOSIS — I10 ESSENTIAL HYPERTENSION: Chronic | ICD-10-CM

## 2023-12-26 DIAGNOSIS — E78.2 MIXED HYPERLIPIDEMIA: Chronic | ICD-10-CM

## 2023-12-26 PROCEDURE — 99214 OFFICE O/P EST MOD 30 MIN: CPT | Mod: S$GLB,,, | Performed by: FAMILY MEDICINE

## 2023-12-26 PROCEDURE — 99214 PR OFFICE/OUTPT VISIT, EST, LEVL IV, 30-39 MIN: ICD-10-PCS | Mod: S$GLB,,, | Performed by: FAMILY MEDICINE

## 2023-12-26 RX ORDER — LISINOPRIL 20 MG/1
20 TABLET ORAL DAILY
Qty: 90 TABLET | Refills: 3 | Status: SHIPPED | OUTPATIENT
Start: 2023-12-26

## 2023-12-26 RX ORDER — POTASSIUM CHLORIDE 20 MEQ/1
20 TABLET, EXTENDED RELEASE ORAL DAILY
Qty: 90 TABLET | Refills: 3 | Status: SHIPPED | OUTPATIENT
Start: 2023-12-26

## 2023-12-26 RX ORDER — SIMVASTATIN 20 MG/1
20 TABLET, FILM COATED ORAL NIGHTLY
Qty: 90 TABLET | Refills: 3 | Status: SHIPPED | OUTPATIENT
Start: 2023-12-26

## 2023-12-26 RX ORDER — METOPROLOL TARTRATE 25 MG/1
25 TABLET, FILM COATED ORAL 2 TIMES DAILY
Qty: 180 TABLET | Refills: 3 | Status: SHIPPED | OUTPATIENT
Start: 2023-12-26

## 2023-12-26 RX ORDER — FUROSEMIDE 20 MG/1
20 TABLET ORAL 3 TIMES DAILY
Qty: 270 TABLET | Refills: 3 | Status: SHIPPED | OUTPATIENT
Start: 2023-12-26 | End: 2024-12-25

## 2023-12-26 NOTE — PROGRESS NOTES
SUBJECTIVE:    Patient ID: Fatimah Garber is a 89 y.o. female.    Chief Complaint: Hospital Follow Up (Venous stasis of both lower extremities, no bottles, need refill, discuss about medication,abc )    89-year-old female here for hospital follow-up.  She was admitted with venous stasis edema and venous stasis ulcerations of the lower extremity.  [facility].    Hospital Course:   Discharge diagnosis: Venous stasis dermatitis with serous blisters     Hospital Course:   Patient presented with severe venous stasis dermatitis and edema with serous blister formation. She was admitted and placed on IV lasix. Wound care was consulted. She had remarkable improvement with IV lasix with near complete resolution of her LE edema. Renal function remained stable. She discharged on 12/12 with an increase of her home PO lasix to 60mg and with  wound care.      Medication Changes:  Increase lasix to 60mg daily  Stop amlodipine as this can contribute to LE edema. Given the increase in lasix and marginal low-BP while hospitalized, I have not substituted another anti-HTN, but this may be a consideration in the future pending her BP trend.      Since being home she has been visited by [facility] home health with the skilled nursing wound care.  They are doing leg wraps on her to lower extremities.  For stasis ulcerations are now healing well and her edema seems to be under control.  Currently on Lasix 60 mg daily with potassium 20 mEq daily.  Aquaphor used on the legs as the ulcers heal.    Cardiology Dr. Evens Meza, chronic atrial fibrillation, on Eliquis 2.5 mg b.i.d..    Appetite is good she eats 2-3 meals per day plus snacks.  She is independent in ambulation around the house with the help of a Rollator.        Admit Date: 12/10/23   Discharge Date: 12/12/23    Discharge Information     Discharge Date:   12/12/23    Primary Discharge Diagnosis:  edema    Discharge Summary:  Reviewed      Medication &  Order Review     Were medication changes made or new medications added?   Yes    If so, has the patient filled the prescriptions?  Yes     Was Home Health ordered? No    If so, has Home Health contacted patient and/or initiated services?  No    Name of Home Health Agency? N/A    Durable Medical Equipment ordered?  No     If so, has the DME provider contacted patient and delivered equipment?  N/A    Follow Up               Any problems since discharge? No    How is the patient feeling since returning home?      Have you set up recommended follow up appointments?  (cardiology, surgery, etc.)    Schedule Hospital Follow-up appointment within 7-14 days (preferably 7).      Notes:         Spoke with patients daughter, states she is going to wound care, overall doing well. They increased her lasix to 60mg daily and d/c her Amlodipine. States she doesn't feel like she needs anything from us at this time. I did offer a few appointments next week but daughter states she refused, states she has too much going on next week. Will come in on 12/26         Garett Mancera     Patient follow up phone call documented on separate encounter.      Admission on 12/10/2023, Discharged on 12/12/2023   Component Date Value Ref Range Status    NT-proBNP 12/10/2023 5120 (H)  5 - 1800 pg/mL Final    WBC 12/10/2023 8.94  3.90 - 12.70 K/uL Final    RBC 12/10/2023 3.77 (L)  4.00 - 5.40 M/uL Final    Hemoglobin 12/10/2023 11.9 (L)  12.0 - 16.0 g/dL Final    Hematocrit 12/10/2023 36.3 (L)  37.0 - 48.5 % Final    MCV 12/10/2023 96  82 - 98 fL Final    MCH 12/10/2023 31.6 (H)  27.0 - 31.0 pg Final    MCHC 12/10/2023 32.8  32.0 - 36.0 g/dL Final    RDW 12/10/2023 16.6 (H)  11.5 - 14.5 % Final    Platelets 12/10/2023 155  150 - 450 K/uL Final    MPV 12/10/2023 12.0  9.2 - 12.9 fL Final    Immature Granulocytes 12/10/2023 0.2  0.0 - 0.5 % Final    Gran # (ANC) 12/10/2023 6.6  1.8 - 7.7 K/uL Final    Immature Grans (Abs) 12/10/2023 0.02  0.00 - 0.04  K/uL Final    Lymph # 12/10/2023 1.3  1.0 - 4.8 K/uL Final    Mono # 12/10/2023 0.9  0.3 - 1.0 K/uL Final    Eos # 12/10/2023 0.2  0.0 - 0.5 K/uL Final    Baso # 12/10/2023 0.05  0.00 - 0.20 K/uL Final    nRBC 12/10/2023 0  0 /100 WBC Final    Gran % 12/10/2023 73.8 (H)  38.0 - 73.0 % Final    Lymph % 12/10/2023 14.0 (L)  18.0 - 48.0 % Final    Mono % 12/10/2023 9.6  4.0 - 15.0 % Final    Eosinophil % 12/10/2023 1.8  0.0 - 8.0 % Final    Basophil % 12/10/2023 0.6  0.0 - 1.9 % Final    Differential Method 12/10/2023 Automated   Final    Sodium 12/10/2023 138  136 - 145 mmol/L Final    Potassium 12/10/2023 4.3  3.5 - 5.1 mmol/L Final    Chloride 12/10/2023 100  95 - 110 mmol/L Final    CO2 12/10/2023 27  22 - 31 mmol/L Final    Glucose 12/10/2023 106  70 - 110 mg/dL Final    BUN 12/10/2023 40 (H)  7 - 18 mg/dL Final    Creatinine 12/10/2023 1.07  0.50 - 1.40 mg/dL Final    Calcium 12/10/2023 9.5  8.4 - 10.2 mg/dL Final    Total Protein 12/10/2023 8.5 (H)  6.0 - 8.4 g/dL Final    Albumin 12/10/2023 4.4  3.5 - 5.2 g/dL Final    Total Bilirubin 12/10/2023 1.0  0.2 - 1.3 mg/dL Final    Alkaline Phosphatase 12/10/2023 66  38 - 145 U/L Final    AST 12/10/2023 35  14 - 36 U/L Final    ALT 12/10/2023 24  0 - 35 U/L Final    Anion Gap 12/10/2023 11  5 - 12 mmol/L Final    eGFR 12/10/2023 48 (A)  >60 mL/min/1.73 m^2 Final    Magnesium 12/10/2023 2.5  1.6 - 2.6 mg/dL Final    Troponin I 12/10/2023 0.026  0.012 - 0.034 ng/mL Final    Specimen UA 12/10/2023 Urine, Clean Catch   Final    Color, UA 12/10/2023 Yellow  Yellow, Straw, [place] Final    Appearance, UA 12/10/2023 Cloudy (A)  Clear Final    pH, UA 12/10/2023 7.0  5.0 - 8.0 Final    Specific Gravity, UA 12/10/2023 <=1.005 (A)  1.005 - 1.030 Final    Protein, UA 12/10/2023 Negative  Negative Final    Glucose, UA 12/10/2023 Negative  Negative Final    Ketones, UA 12/10/2023 Negative  Negative Final    Bilirubin (UA) 12/10/2023 Negative  Negative Final    Occult Blood UA  12/10/2023 Trace (A)  Negative Final    Nitrite, UA 12/10/2023 Negative  Negative Final    Urobilinogen, UA 12/10/2023 1.0  <2.0 EU/dL Final    Leukocytes, UA 12/10/2023 Trace (A)  Negative Final    BSA 12/11/2023 1.57  m2 Final    LVOT stroke volume 12/11/2023 172.05  cm3 Final    LVIDd 12/11/2023 5.25  3.5 - 6.0 cm Final    LV Systolic Volume 12/11/2023 41.00  mL Final    LV Systolic Volume Index 12/11/2023 25.9  mL/m2 Final    LVIDs 12/11/2023 3.20  2.1 - 4.0 cm Final    LV Diastolic Volume 12/11/2023 132.00  mL Final    LV Diastolic Volume Index 12/11/2023 83.54  mL/m2 Final    IVS 12/11/2023 1.41 (A)  0.6 - 1.1 cm Final    LVOT diameter 12/11/2023 2.10  cm Final    LVOT area 12/11/2023 3.5  cm2 Final    FS 12/11/2023 39  28 - 44 % Final    Left Ventricle Relative Wall Thick* 12/11/2023 0.44  cm Final    Posterior Wall 12/11/2023 1.16 (A)  0.6 - 1.1 cm Final    LV mass 12/11/2023 278.08  g Final    LV Mass Index 12/11/2023 176  g/m2 Final    MV Peak E Geoffrey 12/11/2023 1.17  m/s Final    TR Max Geoffrey 12/11/2023 3.61  m/s Final    E wave deceleration time 12/11/2023 220.00  msec Final    LVOT peak geoffrey 12/11/2023 2.39  m/s Final    Left Ventricular Outflow Tract [place]* 12/11/2023 1.75  cm/s Final    Left Ventricular Outflow Tract [place]* 12/11/2023 14.00  mmHg Final    LA size 12/11/2023 3.90  cm Final    LA volume (mod) 12/11/2023 72.30  cm3 Final    LA Volume Index (Mod) 12/11/2023 45.8  mL/m2 Final    RA Major Axis 12/11/2023 5.35  cm Final    RA Width 12/11/2023 4.29  cm Final    AV mean gradient 12/11/2023 11  mmHg Final    AV peak gradient 12/11/2023 15  mmHg Final    Ao peak geoffrey 12/11/2023 1.95  m/s Final    Ao VTI 12/11/2023 37.40  cm Final    LVOT peak VTI 12/11/2023 49.70  cm Final    AV valve area 12/11/2023 4.60  cm² Final    AV Velocity Ratio 12/11/2023 1.23   Final    AV index (prosthetic) 12/11/2023 1.33   Final    RAFAEL by Velocity Ratio 12/11/2023 4.24  cm² Final    MV mean gradient 12/11/2023 5  mmHg Final     MV peak gradient 12/11/2023 12  mmHg Final    MV stenosis pressure 1/2 time 12/11/2023 64.00  ms Final    MV valve area p 1/2 method 12/11/2023 3.44  cm2 Final    MV valve area by continuity eq 12/11/2023 6.72  cm2 Final    MV VTI 12/11/2023 25.6  cm Final    Triscuspid Valve Regurgitation Pea* 12/11/2023 52  mmHg Final    PV PEAK VELOCITY 12/11/2023 1.04  m/s Final    PV peak gradient 12/11/2023 4  mmHg Final    ZLVIDS 12/11/2023 1.04   Final    ZLVIDD 12/11/2023 1.49   Final    RBC, UA 12/10/2023 10 (H)  0 - 4 /hpf Final    WBC, UA 12/10/2023 2  0 - 5 /hpf Final    Bacteria 12/10/2023 Many (A)  Negative /hpf Final    Squam Epithel, UA 12/10/2023 0  /hpf Final    Hyaline Casts, UA 12/10/2023 0  0 - 1 /lpf Final    Ca Carb Margarita, UA 12/10/2023 Few  None-Moderate Final    Microscopic Comment 12/10/2023 SEE COMMENT   Final    Sodium 12/11/2023 136  136 - 145 mmol/L Final    Potassium 12/11/2023 3.9  3.5 - 5.1 mmol/L Final    Chloride 12/11/2023 96  95 - 110 mmol/L Final    CO2 12/11/2023 31  22 - 31 mmol/L Final    Glucose 12/11/2023 97  70 - 110 mg/dL Final    BUN 12/11/2023 32 (H)  7 - 18 mg/dL Final    Creatinine 12/11/2023 0.96  0.50 - 1.40 mg/dL Final    Calcium 12/11/2023 9.1  8.4 - 10.2 mg/dL Final    Anion Gap 12/11/2023 9  5 - 12 mmol/L Final    eGFR 12/11/2023 55 (A)  >60 mL/min/1.73 m^2 Final    WBC 12/11/2023 10.29  3.90 - 12.70 K/uL Final    RBC 12/11/2023 3.90 (L)  4.00 - 5.40 M/uL Final    Hemoglobin 12/11/2023 12.1  12.0 - 16.0 g/dL Final    Hematocrit 12/11/2023 37.2  37.0 - 48.5 % Final    MCV 12/11/2023 95  82 - 98 fL Final    MCH 12/11/2023 31.0  27.0 - 31.0 pg Final    MCHC 12/11/2023 32.5  32.0 - 36.0 g/dL Final    RDW 12/11/2023 16.2 (H)  11.5 - 14.5 % Final    Platelets 12/11/2023 167  150 - 450 K/uL Final    MPV 12/11/2023 12.4  9.2 - 12.9 fL Final    Immature Granulocytes 12/11/2023 0.4  0.0 - 0.5 % Final    Gran # (ANC) 12/11/2023 7.4  1.8 - 7.7 K/uL Final    Immature Grans (Abs)  12/11/2023 0.04  0.00 - 0.04 K/uL Final    Lymph # 12/11/2023 1.5  1.0 - 4.8 K/uL Final    Mono # 12/11/2023 1.1 (H)  0.3 - 1.0 K/uL Final    Eos # 12/11/2023 0.2  0.0 - 0.5 K/uL Final    Baso # 12/11/2023 0.06  0.00 - 0.20 K/uL Final    nRBC 12/11/2023 0  0 /100 WBC Final    Gran % 12/11/2023 72.3  38.0 - 73.0 % Final    Lymph % 12/11/2023 14.3 (L)  18.0 - 48.0 % Final    Mono % 12/11/2023 10.3  4.0 - 15.0 % Final    Eosinophil % 12/11/2023 2.1  0.0 - 8.0 % Final    Basophil % 12/11/2023 0.6  0.0 - 1.9 % Final    Differential Method 12/11/2023 Automated   Final    Sodium 12/12/2023 136  136 - 145 mmol/L Final    Potassium 12/12/2023 4.1  3.5 - 5.1 mmol/L Final    Chloride 12/12/2023 98  95 - 110 mmol/L Final    CO2 12/12/2023 32 (H)  22 - 31 mmol/L Final    Glucose 12/12/2023 97  70 - 110 mg/dL Final    BUN 12/12/2023 32 (H)  7 - 18 mg/dL Final    Creatinine 12/12/2023 1.09  0.50 - 1.40 mg/dL Final    Calcium 12/12/2023 8.3 (L)  8.4 - 10.2 mg/dL Final    Anion Gap 12/12/2023 6  5 - 12 mmol/L Final    eGFR 12/12/2023 47 (A)  >60 mL/min/1.73 m^2 Final    WBC 12/12/2023 9.16  3.90 - 12.70 K/uL Final    RBC 12/12/2023 3.54 (L)  4.00 - 5.40 M/uL Final    Hemoglobin 12/12/2023 11.3 (L)  12.0 - 16.0 g/dL Final    Hematocrit 12/12/2023 34.5 (L)  37.0 - 48.5 % Final    MCV 12/12/2023 98  82 - 98 fL Final    MCH 12/12/2023 31.9 (H)  27.0 - 31.0 pg Final    MCHC 12/12/2023 32.8  32.0 - 36.0 g/dL Final    RDW 12/12/2023 16.1 (H)  11.5 - 14.5 % Final    Platelets 12/12/2023 151  150 - 450 K/uL Final    MPV 12/12/2023 12.3  9.2 - 12.9 fL Final    Immature Granulocytes 12/12/2023 0.2  0.0 - 0.5 % Final    Gran # (ANC) 12/12/2023 6.6  1.8 - 7.7 K/uL Final    Immature Grans (Abs) 12/12/2023 0.02  0.00 - 0.04 K/uL Final    Lymph # 12/12/2023 1.3  1.0 - 4.8 K/uL Final    Mono # 12/12/2023 1.0  0.3 - 1.0 K/uL Final    Eos # 12/12/2023 0.3  0.0 - 0.5 K/uL Final    Baso # 12/12/2023 0.05  0.00 - 0.20 K/uL Final    nRBC 12/12/2023 0  0  /100 WBC Final    Gran % 2023 71.6  38.0 - 73.0 % Final    Lymph % 2023 13.8 (L)  18.0 - 48.0 % Final    Mono % 2023 10.6  4.0 - 15.0 % Final    Eosinophil % 2023 3.3  0.0 - 8.0 % Final    Basophil % 2023 0.5  0.0 - 1.9 % Final    Differential Method 2023 Automated   Final       Past Medical History:   Diagnosis Date    Atrial fibrillation     Hyperlipidemia     Hypertension     Osteoarthritis     Shingles 2021    completed medicine     Past Surgical History:   Procedure Laterality Date     SECTION      x4    HYSTERECTOMY       Family History   Problem Relation Age of Onset    Hypertension Mother     Kidney disease Mother     Heart disease Father     Cancer Sister         lung Ca       Marital Status:   Alcohol History:  reports that she does not currently use alcohol.  Tobacco History:  reports that she has never smoked. She has never used smokeless tobacco.  Drug History:  reports no history of drug use.    Review of patient's allergies indicates:  No Known Allergies    Current Outpatient Medications:     calcium carbonate/vitamin D3 (CALCIUM 600 + D,3, ORAL), Take 1 tablet by mouth once daily. Indications: supplement, Disp: , Rfl:     docusate sodium (COLACE) 100 MG capsule, Take 1 capsule (100 mg total) by mouth 2 (two) times daily., Disp: 180 capsule, Rfl: 1    ELIQUIS 2.5 mg Tab, Take 2.5 mg by mouth 2 (two) times daily., Disp: , Rfl:     latanoprost 0.005 % ophthalmic solution, Place 1 drop into both eyes once daily., Disp: , Rfl:     multivit-min/ferrous fumarate (MULTI VITAMIN ORAL), Take 1 tablet by mouth once daily. Indications: supplement, Disp: , Rfl:     OXYGEN-AIR DELIVERY SYSTEMS MISC, 2-3 L/min by Nasal route as needed (Shortness of breath). Indications: oxygen supplement, Disp: , Rfl:     RESTASIS 0.05 % ophthalmic emulsion, Place 1 drop into both eyes 2 (two) times daily., Disp: , Rfl:     vitamin B complex (B COMPLEX-VITAMIN B12 ORAL), Take  1,000 mcg by mouth once daily. Indications: supplement, Disp: , Rfl:     furosemide (LASIX) 20 MG tablet, Take 1 tablet (20 mg total) by mouth 3 (three) times daily., Disp: 270 tablet, Rfl: 3    lisinopriL (PRINIVIL,ZESTRIL) 20 MG tablet, Take 1 tablet (20 mg total) by mouth once daily., Disp: 90 tablet, Rfl: 3    metoprolol tartrate (LOPRESSOR) 25 MG tablet, Take 1 tablet (25 mg total) by mouth 2 (two) times a day., Disp: 180 tablet, Rfl: 3    potassium chloride SA (K-DUR,KLOR-CON) 20 MEQ tablet, Take 1 tablet (20 mEq total) by mouth once daily., Disp: 90 tablet, Rfl: 3    simvastatin (ZOCOR) 20 MG tablet, Take 1 tablet (20 mg total) by mouth every evening., Disp: 90 tablet, Rfl: 3  No current facility-administered medications for this visit.    Review of Systems   Constitutional:  Negative for appetite change, chills, fatigue, fever and unexpected weight change.   HENT:  Negative for ear discharge and ear pain.    Eyes:  Negative for pain, discharge and visual disturbance.   Respiratory:  Positive for shortness of breath. Negative for apnea, cough and wheezing.    Cardiovascular:  Positive for leg swelling. Negative for chest pain and palpitations.   Gastrointestinal:  Negative for abdominal pain, blood in stool, constipation, diarrhea, nausea, vomiting and reflux.   Endocrine: Negative for cold intolerance, heat intolerance and polydipsia.   Genitourinary:  Negative for bladder incontinence, dysuria, hematuria, nocturia and urgency.   Musculoskeletal:  Negative for gait problem, joint swelling and myalgias.   Integumentary:  Positive for wound.   Neurological:  Negative for dizziness, seizures and numbness.   Psychiatric/Behavioral:  Negative for behavioral problems and hallucinations. The patient is not nervous/anxious.          Objective:      There were no vitals filed for this visit.  Physical Exam  Vitals and nursing note reviewed.   Constitutional:       Appearance: She is well-developed.      Comments:  Frail elderly female in no apparent distress.  She walks with a Rollator   HENT:      Head: Normocephalic and atraumatic.      Right Ear: Tympanic membrane and external ear normal.      Left Ear: Tympanic membrane and external ear normal.      Nose: Nose normal.      Mouth/Throat:      Pharynx: Oropharynx is clear.   Eyes:      Pupils: Pupils are equal, round, and reactive to light.   Neck:      Thyroid: No thyromegaly.      Vascular: No carotid bruit.   Cardiovascular:      Rate and Rhythm: Normal rate. Rhythm irregular.      Heart sounds: Normal heart sounds. No murmur heard.  Pulmonary:      Effort: Pulmonary effort is normal.      Breath sounds: Normal breath sounds. No wheezing or rales.   Abdominal:      General: Bowel sounds are normal. There is no distension.      Palpations: Abdomen is soft.      Tenderness: There is no abdominal tenderness.   Musculoskeletal:         General: No tenderness or deformity. Normal range of motion.      Cervical back: Normal range of motion and neck supple.      Lumbar back: Normal. No spasms.      Comments: Bends 90 degrees at  waist shoulders have decreased flexion and extension, knees are slightly crepitant but have good range of motion.  She has trace to 1+ pitting edema to lower extremity below-the-knees bilaterally   Lymphadenopathy:      Cervical: No cervical adenopathy.   Skin:     General: Skin is warm and dry.      Findings: No rash.      Comments: Four venous stasis ulcers are present on her legs, in various stages of healing.  Leg wraps are working well.   Neurological:      Mental Status: She is alert and oriented to person, place, and time.      Cranial Nerves: No cranial nerve deficit.      Coordination: Coordination normal.      Gait: Gait abnormal (ambulates with Rollator).   Psychiatric:         Mood and Affect: Mood normal.         Behavior: Behavior normal.         Thought Content: Thought content normal.         Judgment: Judgment normal.          Assessment:       1. Venous stasis dermatitis of both lower extremities    2. Acute on chronic combined systolic and diastolic heart failure    3. Essential hypertension    4. Mixed hyperlipidemia    5. Paroxysmal atrial fibrillation    6. Chronic diastolic heart failure    7. Chronic respiratory failure with hypoxia    8. Chronic atrial fibrillation    9. Chronic anticoagulation    10. Primary osteoarthritis of left hip    11. Advanced age    12. Hospital discharge follow-up         Plan:       Venous stasis dermatitis of both lower extremities  Patient is making good progress with the help of home health nursing.  Continue leg wraps and dressing changes.  Acute on chronic combined systolic and diastolic heart failure  -     potassium chloride SA (K-DUR,KLOR-CON) 20 MEQ tablet; Take 1 tablet (20 mEq total) by mouth once daily.  Dispense: 90 tablet; Refill: 3    Essential hypertension  -     lisinopriL (PRINIVIL,ZESTRIL) 20 MG tablet; Take 1 tablet (20 mg total) by mouth once daily.  Dispense: 90 tablet; Refill: 3    Mixed hyperlipidemia  -     simvastatin (ZOCOR) 20 MG tablet; Take 1 tablet (20 mg total) by mouth every evening.  Dispense: 90 tablet; Refill: 3    Paroxysmal atrial fibrillation  -     metoprolol tartrate (LOPRESSOR) 25 MG tablet; Take 1 tablet (25 mg total) by mouth 2 (two) times a day.  Dispense: 180 tablet; Refill: 3  AFib is rate controlled  Chronic diastolic heart failure  -     furosemide (LASIX) 20 MG tablet; Take 1 tablet (20 mg total) by mouth 3 (three) times daily.  Dispense: 270 tablet; Refill: 3  Continue Lasix 60 mg daily  Chronic respiratory failure with hypoxia    Chronic atrial fibrillation  GFR 47 BUN 32 creatinine 1.09  Chronic anticoagulation    Primary osteoarthritis of left hip    Advanced age    Hospital discharge follow-up  Hospital medications reconciled home medications    Follow up in about 3 months (around 3/26/2024), or [facility]-chronic atrial fib, venous stasis  ulcers.

## 2024-01-11 DIAGNOSIS — Z00.00 ENCOUNTER FOR MEDICARE ANNUAL WELLNESS EXAM: ICD-10-CM

## 2024-03-11 PROBLEM — J96.11 CHRONIC RESPIRATORY FAILURE WITH HYPOXIA: Status: RESOLVED | Noted: 2023-02-06 | Resolved: 2024-03-11

## 2024-04-04 NOTE — TELEPHONE ENCOUNTER
"Unable to leave voice message for patient's daughter Elisa due to mail box being full.     Labs have been received from Dr. Meza's office. Per Dr. Mancera, "Call patient's family, thyroid very underactive add levothyroxine 0.5 mcg PO a day quantity 30 with 2 refills."  "

## 2024-04-05 NOTE — TELEPHONE ENCOUNTER
Patient's daughter returned call to clinic. Informed of Dr. Mancera's message and Elisa verbalized understanding. Preferred pharmacy [facility] close to [facility]

## 2024-04-08 ENCOUNTER — OFFICE VISIT (OUTPATIENT)
Dept: OTOLARYNGOLOGY | Facility: CLINIC | Age: 89
End: 2024-04-08
Payer: MEDICARE

## 2024-04-08 VITALS — WEIGHT: 132.25 LBS | BODY MASS INDEX: 23.43 KG/M2 | HEIGHT: 63 IN

## 2024-04-08 DIAGNOSIS — R04.0 ANTERIOR EPISTAXIS: Primary | ICD-10-CM

## 2024-04-08 DIAGNOSIS — Z79.01 CHRONIC ANTICOAGULATION: ICD-10-CM

## 2024-04-08 DIAGNOSIS — R04.0 EPISTAXIS: ICD-10-CM

## 2024-04-08 PROCEDURE — 99203 OFFICE O/P NEW LOW 30 MIN: CPT | Mod: 25,S$PBB,, | Performed by: OTOLARYNGOLOGY

## 2024-04-08 PROCEDURE — 30901 CONTROL OF NOSEBLEED: CPT | Mod: PBBFAC,PO,LT | Performed by: OTOLARYNGOLOGY

## 2024-04-08 PROCEDURE — 30901 CONTROL OF NOSEBLEED: CPT | Mod: S$PBB,LT,, | Performed by: OTOLARYNGOLOGY

## 2024-04-08 PROCEDURE — 99999 PR PBB SHADOW E&M-EST. PATIENT-LVL III: CPT | Mod: PBBFAC,,, | Performed by: OTOLARYNGOLOGY

## 2024-04-08 PROCEDURE — 99213 OFFICE O/P EST LOW 20 MIN: CPT | Mod: PBBFAC,PO,25 | Performed by: OTOLARYNGOLOGY

## 2024-04-08 RX ORDER — LEVOTHYROXINE SODIUM 50 UG/1
50 TABLET ORAL
Qty: 30 TABLET | Refills: 2 | Status: ON HOLD | OUTPATIENT
Start: 2024-04-08 | End: 2024-06-01 | Stop reason: HOSPADM

## 2024-04-08 NOTE — PATIENT INSTRUCTIONS
"Nasal Moisture Therapy:    A dry nose can cause crusting, pain, bleeding, nasal congestion, and intermittent drainage. It is important to keep the nose moisturized. This is the best way to reduce the risk of bleeding, as it prevents the blood vessels from coming to the surface and opening up.     Nasal emollients (moisturizers) are most important. There are multiple over the counter or natural products available, including many that are around the house. Ones that I like are:  Coconut oil, Aquaphor, Ponaris nasal ointment, Vaseline or Mupirocin bacterial ointment if there is also an infection associated with it. For any of these:  Deposit a pea or dime sized amount into the entrance to the nasal cavity with a q-tip or pinky finger. Apply to the septum (portion that divides the left and right side) as this is the area where crusting and bleeding develops more commonly  Perform this at least 2 times daily, including before bed. This allows the ointment to melt along the nasal cavity when you lay down.  Nasal saline spray or irrigations can help wash away mucous and crusting and keep the nose healthy. Perform BEFORE applying the nasal emollient  Use a humidifier in the bedroom  If you use CPAP, make sure it has humidification on it.  Be aware that medical nasal sprays can occasionally dry the nose out, so keep the nose moist while using these medications.      Care Instructions Following Nasal Cautery  Je Blank MD  Otolaryngology - Head and Neck Surgery  Office: [phone]  Cell: [phone]  Fax: [phone]      Nasal cautery was performed to a blood vessel inside the nose to reduce nosebleeds. This involved a small, superficial burn that will cause a scab to form inside the nose over the blood vessel that was cauterized. The goal is to "re-surface" the area that was bleeding and prevent the blood vessel from growing back and causing nosebleeds.     What to expect  Very mild nasal soreness or itching for a " "few days following the procedure. It is OK to Tylenol or Motrin for this pain, and call Dr. Blank if not controlled.   Sensation of a scan or irritation inside the nose for 7-10 days. This is the scab that is healing over the cauterized area.  It is normal to have mild nosebleeds during the healing process. The cautery does not work immediately. It requires time for the scab to heal, and in the meantime, you may have nosebleeds.   You may have absorbable packing in the nose which serves as a moisture barrier over the scar, do not pick this off prematurely unless it is hanging out of the nose.     Instructions  The most important thing you can do is avoid disturbing the scab. Do not pick the nose or forcefully blow for 2 weeks.   After 3 days, you should use a saline spray (1-2 sprays in the affected nostril) to help moisturize the scab and nasal cavity.   You should aggressively moisturize the nose with a nasal moisturizer twice daily, following saline use. Please see below under "Nosebleed prevention" for examples.    If you have persistent nosebleeds despite cautery (after everything has healed) this may be a sign that there are additional blood vessels or you a stronger cautery that is done under anesthesia. Follow-up if they are persistent.         Epistaxis (Nosebleeds)    The nose and nasal cavity have a large supply of blood vessels. Occasionally, one of these blood vessels will open up and begin bleeding. When a part of the body has bleeding, direct pressure to the area allows a clot (scab) to form.     The difference between bleeding within the nose and on another part of the body is that it is difficult to apply direct pressure inside the nose. As a result, a nosebleed can last much longer than other bleeds. This may be worsened by elevated blood pressure, use of blood thinners, or underlying bleeding disorders.     Most nosebleeds occur in the front of the nasal cavity. Uncommonly, they can occur " toward the back of the nose, and these are more difficult to treat.    Why do they happen?  As stated above, many blood vessels are inside the nose, most commonly, one of these blood vessels can open up if traumatized or if it becomes thin and opens up due to air dryness, forceful nose blowing or sneezing, or use of nasal medications. Use of a blood thinner and very high blood pressure can elevate this risk.     What happens if I get a nosebleed?  Sit or stand while bending forward slightly at the waist. Do not lie down or tilt your head back. This may cause you to swallow blood and can lead to vomiting and other uncomfortable symptoms.   the soft part of BOTH nostrils at the bottom of your nose to close off the nostrils. Do not  the bridge of your nose, as that will not help the bleeding, and do not apply pressure to just one side, even if the bleeding is only on one side. You will know you have grasped the right part when your voice sounds different.   Squeeze your nose closed for at least 15 minutes and use a clock to time yourself. Do NOT release the pressure every so often to check whether the bleeding has stopped. Many people hurt their chances of stopping the bleeding by releasing the pressure too soon or too often.  Nasal decongestants are effective. - You can use Afrin (over-the- counter) spray to use as needed to help your nosebleed stop quicker. 2-3 sprays in the affected nostril is typically enough, followed by firm pressure as listed in step 2. Do not use this medication for more than 3 days in a row.    If you want, you can also apply a cold compress or ice pack to the bridge of your nose. This can help the blood vessels constrict and slow the bleeding. This step is not usually necessary, but many people like to do it.    If it continues to bleed, repeat the steps again. If you continue to bleed even then, seek  emergency medical care, either at an emergency room or at an urgent care  clinic.      How can I prevent nosebleeds?    The main way to prevent nosebleeds is to keep the nose moisturized and avoid unnecessary nasal trauma.    - Place water-soluble nasal gels or ointments in your nostrils with a cotton swab or tip of your pinky. Complete this twice daily. Use a dime or pinky sized amount. Aquaphor ®, Coconut oil, Vaseline ® , Ayr Gel ®, or Ponaris ® are examples of over-the- counter ointments that you can use. Be sure not to insert the swab more than ¼ inch into your nose. These gels and ointments can be purchased in most pharmacies.    - Use a saline nasal spray or saline nose drops two to three times a day in each nostril. These products can be purchased over-the- counter or made at home. (To make the saline solution at home: mix 1 teaspoon of salt into 1 quart of tap water. Boil water for 20 minutes, cool until lukewarm.    - Use a humidifier in your bedroom while sleeping, especially when the air is very dry    - Avoid blowing your nose too forcefully, but you may choose to blow your nose after using nasal saline sprays or drops.    - Sneeze through an open mouth.    - Avoid putting anything solid into your nose, including fingers and cotton applicators.    - Sometimes you may need cautery performed to reduce the bleeding. This can be done in the office or operating room depending on severity.

## 2024-04-08 NOTE — PROGRESS NOTES
Subjective:       Patient ID: Fatimah Garber is a 89 y.o. female.    Chief Complaint: Epistaxis (Pt does take Eliquis and the nosebleeds are getting worse )    Fatimah is here for epistaxis.   Length of symptoms: began a few weeks ago.  Visited ER where no intervention was necessary.  Has continued since that time, predominantly left sided. Anterior in nature. Lasts about an hour. Will stop with pressure.     Pertinent medical issues: systolic heart failure, chronic anti-coagulation, chronic O2 requirement    Patient validated questionnaires (if applicable):      %            No data to display                   No data to display                   No data to display                     Social History     Tobacco Use   Smoking Status Never   Smokeless Tobacco Never     Social History     Substance and Sexual Activity   Alcohol Use Not Currently    Comment: Occasional          Objective:        Constitutional:   Vital signs are normal. She appears well-developed and well-nourished.     Head:  Normocephalic and atraumatic.   Oxygen nasal cannula     Ears:  Hearing normal to normal and whispered voice; external ear normal without scars, lesions, or masses; ear canal, tympanic membrane, and middle ear normal..     Nose:  Nose normal including turbinates, nasal mucosa, sinuses and nasal septum.   Punctate vessel along surface of left nasal septum.     Mouth/Throat  Oropharynx clear and moist without lesions or asymmetry.     Neck:  Neck normal without thyromegaly masses, asymmetry, normal tracheal structure, crepitus, and tenderness.         Tests / Results:  Name: Fatimah Garber     Pre-procedure diagnosis: Anterior epistaxis [R04.0]  Post-procedure diagnosis: Same    Procedure: Control of anterior epistaxis, simple  Anesthesia:  2% Lidocaine  Performed by: Je Blank MD    Procedure: Risks, benefits, and alternatives of the procedure were discussed with the patient. Risks include continue / recurrent epistaxis, crusting,  pain, and septal perforation. They agreed to the procedure. The nasal septum was anesthestized with Lidocaine over a cotton ball. After adequate anesthesia was obtained, the prominent vessels were identified on the left and were controlled with silver nitrate. There was no bleeding. Patient tolerated well. Mupirocin ointment was placed over the area. The patient was discharged in stable condition.        Assessment:       1. Anterior epistaxis    2. Chronic anticoagulation          Plan:         Left nasal cautery to an obvious anterior septal source  Moisturize. Discussed can have some bleeds during healing, particularly as she is on AC  If persistent, recommend electrocautery with topical medication - family can call if so.

## 2024-04-08 NOTE — TELEPHONE ENCOUNTER
Ivette Pitt Staff  Caller: Unspecified (Today,  8:43 AM)  We called the patients daughter Elisa on Friday and told her we were calling in a new medication because the patient's  Thyroid was low. There is no new prescription at [facility] [address]. Elisa's # [phone]       Spoke with Elisa and informed that Dr. Mancera will be sending in prescription today.

## 2024-05-08 ENCOUNTER — TELEPHONE (OUTPATIENT)
Dept: OTOLARYNGOLOGY | Facility: CLINIC | Age: 89
End: 2024-05-08
Payer: MEDICARE

## 2024-05-08 NOTE — TELEPHONE ENCOUNTER
----- Message from Ronak Fuentes sent at 5/8/2024 12:21 PM CDT -----  Regarding: Nose bleeds  Contact: Daughter  Type:  Needs Medical Advice    Who Called: Luz pts Daughter        Would the patient rather a call back or a response via [facility]? Call back    Best Call Back Number: [phone]    Additional Information: Sts they need the medicine ordered for the pts nose bleeds.  She is still having them and said she had one last night that took over an hour for them to control.  Sts they need to talk to someone about the medication that was discussed and the course of treatment that needs to be done.   Please advise -- Thank you

## 2024-05-08 NOTE — TELEPHONE ENCOUNTER
S/w daughter and she states that the pt is now on O2 therapy and has had a couple of nose bleeds. Daughter is calling to schedule the procedure that was discussed at clinic visit. Please advise.

## 2024-05-13 DIAGNOSIS — Z79.01 CHRONIC ANTICOAGULATION: ICD-10-CM

## 2024-05-13 DIAGNOSIS — R04.0 ANTERIOR EPISTAXIS: Primary | ICD-10-CM

## 2024-05-13 RX ORDER — AMOXICILLIN 875 MG/1
875 TABLET, FILM COATED ORAL EVERY 12 HOURS
Qty: 10 TABLET | Refills: 0 | Status: SHIPPED | OUTPATIENT
Start: 2024-05-13 | End: 2024-05-18

## 2024-05-31 PROBLEM — E03.9 HYPOTHYROIDISM: Status: ACTIVE | Noted: 2024-05-31

## 2024-05-31 PROBLEM — J96.10 CHRONIC RESPIRATORY FAILURE: Status: ACTIVE | Noted: 2023-02-06

## 2024-06-09 PROBLEM — I48.11 LONGSTANDING PERSISTENT ATRIAL FIBRILLATION: Status: ACTIVE | Noted: 2024-06-09

## 2024-06-09 PROBLEM — R09.02 HYPOXEMIA: Status: ACTIVE | Noted: 2024-06-09

## 2024-06-09 PROBLEM — R04.0 LEFT-SIDED EPISTAXIS: Status: ACTIVE | Noted: 2024-06-09

## 2024-06-09 PROBLEM — R79.89 ELEVATED TROPONIN: Status: ACTIVE | Noted: 2024-06-09

## 2024-06-10 ENCOUNTER — TELEPHONE (OUTPATIENT)
Dept: FAMILY MEDICINE | Facility: CLINIC | Age: 89
End: 2024-06-10
Payer: MEDICARE

## 2024-06-10 NOTE — TELEPHONE ENCOUNTER
----- Message from Erika Worrell MA sent at 6/10/2024  8:57 AM CDT -----  Araseli with [facility] calling to schedule HFU.  Patient will be discharging today, please contact patient to schedule.

## 2024-06-11 ENCOUNTER — TELEPHONE (OUTPATIENT)
Dept: FAMILY MEDICINE | Facility: CLINIC | Age: 89
End: 2024-06-11
Payer: MEDICARE

## 2024-06-11 NOTE — TELEPHONE ENCOUNTER
----- Message from Felipa Haddad MA sent at 6/10/2024  9:00 AM CDT -----  Erika Worrell MA P Michael Casey Staff  Caller: Unspecified (Today,  8:57 AM)  Araseli with [facility] calling to schedule HFU.  Patient will be discharging today, please contact patient to schedule

## 2024-06-12 ENCOUNTER — OUTPATIENT CASE MANAGEMENT (OUTPATIENT)
Dept: ADMINISTRATIVE | Facility: OTHER | Age: 89
End: 2024-06-12
Payer: MEDICARE

## 2024-06-12 NOTE — LETTER
Fatimah Garber  [address] [address]      Dear Fatimah Garber,     I work with [facility] Outpatient Care Management Department. We received a referral to call you to discuss your medical history. These services are free of charge and are offered to [facility] patients who have recently been discharged from any of our facilities or who have medical conditions that may require the skill of a nurse to assist with management.     I am a Registered Nurse who specializes in connecting patients with available medical and financial resources as well as addressing any educational needs that may be indicated.    I attempted to reach you by telephone, but I was unsuccessful. Please call our department so that we can go over some questions with you, regarding your health.    The Outpatient Care Management Department can be reached at [phone], from 8:00AM to 4:30 PM, on Monday thru Friday.     Additionally, [facility] also has a program where a nurse is available 24/7 to answer questions or provide medical advice, their number is [phone].      Thanks,        Jessie Mata RN  Outpatient Care Management  Phone #: [phone]

## 2024-06-12 NOTE — PROGRESS NOTES
6/12/2024  2nd attempt to complete Initial Assessment  for Outpatient Care Management, left message.  Will send via portal -  unable to assess letter.

## 2024-06-12 NOTE — TELEPHONE ENCOUNTER
Called the patients daughter, states she is doing fine since her hospital stay but they are keeping her busy, she is Seeing Dr. Meza tomorrow. States the transitional nurse is there currently going over medications. States they are scheduled with the otolaryngologist on 6/17 because she has multiple nose bleeds requiring a hospital stay and they are trying to see about getting her off of the blood thinners. She refused an appointment here at this time but agrees to call back to get her scheduled once they get all of this straightened out.

## 2024-06-15 NOTE — TELEPHONE ENCOUNTER
----- Message from Corrine Hollins sent at 7/27/2023  9:55 AM CDT -----  Patient called and stated that she called and left a message for the nurse to call her back about rescheduling her appointment and she has not received a call back of yet. Please give her a call at [phone]    
Tried calling patient again, no answer.   
Tried calling patient to get her rescheduled again, says call cannot be completed at this time.  
0 (no pain/absence of nonverbal indicators of pain)

## 2024-06-17 ENCOUNTER — OFFICE VISIT (OUTPATIENT)
Dept: OTOLARYNGOLOGY | Facility: CLINIC | Age: 89
End: 2024-06-17
Payer: MEDICARE

## 2024-06-17 ENCOUNTER — TELEPHONE (OUTPATIENT)
Dept: CARDIOLOGY | Facility: CLINIC | Age: 89
End: 2024-06-17
Payer: MEDICARE

## 2024-06-17 VITALS — HEIGHT: 65 IN | BODY MASS INDEX: 21.52 KG/M2 | WEIGHT: 129.19 LBS

## 2024-06-17 DIAGNOSIS — R04.0 ANTERIOR EPISTAXIS: Primary | ICD-10-CM

## 2024-06-17 DIAGNOSIS — Z79.01 CHRONIC ANTICOAGULATION: ICD-10-CM

## 2024-06-17 PROCEDURE — 99213 OFFICE O/P EST LOW 20 MIN: CPT | Mod: PBBFAC,PO | Performed by: OTOLARYNGOLOGY

## 2024-06-17 PROCEDURE — 99999 PR PBB SHADOW E&M-EST. PATIENT-LVL III: CPT | Mod: PBBFAC,,, | Performed by: OTOLARYNGOLOGY

## 2024-06-17 PROCEDURE — 99024 POSTOP FOLLOW-UP VISIT: CPT | Mod: POP,,, | Performed by: OTOLARYNGOLOGY

## 2024-06-17 NOTE — TELEPHONE ENCOUNTER
----- Message from Rodney Sun sent at 6/17/2024  2:07 PM CDT -----  Contact: self  Type:  Sooner Appointment Request    Caller is requesting a sooner appointment.  Caller declined first available appointment listed below.  Caller will not accept being placed on the waitlist and is requesting a message be sent to doctor.    Name of Caller:  Daughter  When is the first available appointment?  N/a  Symptoms:  Referral from Dr. Fuentes/see if canadate for watchman  Would the patient rather a call back or a response via [facility]?     Best Call Back Number:  [phone]  Additional Information:

## 2024-06-18 ENCOUNTER — LAB VISIT (OUTPATIENT)
Dept: LAB | Facility: HOSPITAL | Age: 89
End: 2024-06-18
Attending: NURSE PRACTITIONER
Payer: MEDICARE

## 2024-06-18 DIAGNOSIS — I11.0 HYPERTENSIVE HEART DISEASE WITH CONGESTIVE HEART FAILURE: Primary | ICD-10-CM

## 2024-06-18 LAB
ANION GAP SERPL CALC-SCNC: 9 MMOL/L (ref 8–16)
BUN SERPL-MCNC: 37 MG/DL (ref 10–30)
CALCIUM SERPL-MCNC: 9.6 MG/DL (ref 8.7–10.5)
CHLORIDE SERPL-SCNC: 104 MMOL/L (ref 95–110)
CO2 SERPL-SCNC: 28 MMOL/L (ref 23–29)
CREAT SERPL-MCNC: 1.2 MG/DL (ref 0.5–1.4)
EST. GFR  (NO RACE VARIABLE): 42.2 ML/MIN/1.73 M^2
GLUCOSE SERPL-MCNC: 120 MG/DL (ref 70–110)
POTASSIUM SERPL-SCNC: 4.1 MMOL/L (ref 3.5–5.1)
SODIUM SERPL-SCNC: 141 MMOL/L (ref 136–145)

## 2024-06-18 PROCEDURE — 80048 BASIC METABOLIC PNL TOTAL CA: CPT | Performed by: NURSE PRACTITIONER

## 2024-06-20 ENCOUNTER — OUTPATIENT CASE MANAGEMENT (OUTPATIENT)
Dept: ADMINISTRATIVE | Facility: OTHER | Age: 89
End: 2024-06-20
Payer: MEDICARE

## 2024-06-20 NOTE — PROGRESS NOTES
6/20/2024  3rd attempt to complete Initial Assessment  for Outpatient Care Management, left message. Case closure - unable to reach.

## 2024-06-24 ENCOUNTER — OUTPATIENT CASE MANAGEMENT (OUTPATIENT)
Dept: ADMINISTRATIVE | Facility: OTHER | Age: 89
End: 2024-06-24
Payer: MEDICARE

## 2024-06-24 NOTE — PROGRESS NOTES
OPCM RN returned daughter's call; reviewed [facility] program; currently with Palliative Care, Transitional Care and other assistance available to include digital monitoring; no additional needs at this time. Case already closed.

## 2024-07-03 ENCOUNTER — OFFICE VISIT (OUTPATIENT)
Dept: OTOLARYNGOLOGY | Facility: CLINIC | Age: 89
End: 2024-07-03
Payer: MEDICARE

## 2024-07-03 DIAGNOSIS — Z79.01 CHRONIC ANTICOAGULATION: Primary | ICD-10-CM

## 2024-07-03 DIAGNOSIS — R04.0 RECURRENT EPISTAXIS: ICD-10-CM

## 2024-07-03 PROCEDURE — 30903 CONTROL OF NOSEBLEED: CPT | Mod: PBBFAC,PO | Performed by: OTOLARYNGOLOGY

## 2024-07-03 PROCEDURE — 99211 OFF/OP EST MAY X REQ PHY/QHP: CPT | Mod: PBBFAC,PO,25 | Performed by: OTOLARYNGOLOGY

## 2024-07-03 PROCEDURE — 99999 PR PBB SHADOW E&M-EST. PATIENT-LVL I: CPT | Mod: PBBFAC,,, | Performed by: OTOLARYNGOLOGY

## 2024-07-04 NOTE — PROGRESS NOTES
Patient had a rebleeding episode 2 days ago that required an ER visit. She had restarted her Eliquis 1 week prior.   Rhinorocket was placed and controlled the bleeding.     She has stopped her Eliquis.  Here for management    Patient: Fatimah Garber [id number], :1930  Procedure date:7/3/2024  Patient's medications, allergies, past medical, surgical, social and family histories were reviewed and updated as appropriate.    Chief Complaint:  Epistaxis    HPI:  Fatimah is a 89 y.o. female with epistaxis. she is on anti-coagulation. Bleeding began 2 days ago. Epistaxis did not improve following direct pressure and Afrin.    Procedure: Complex control of anterior epistaxis    Details: Risks, benefits, and alternatives of the procedure were discussed with the patient, and the patient consented to the control of epistaxis.     Rhinorocket was gently deflated and removed. No active brisk bleeding was noted. She had mild oozing along the inferior portion of the nasal floor / vestibule, and 2 small areas along the mid septum. All on the left.    I applied topical anesthetic in the form of Lidocaine.   I applied topical decongestant in the form of Afrin.    After adequate anesthesia was obtained, I applied silver nitrate to the areas, followed by Novapak. at the end of the procedure there was no further bleeding from the nose and sinuses.  The patient tolerated the procedure well with no complications.    begin Nasal saline spray to the affected nostril in 24 hours  Off anti-coagulation indefinitely given her rebleeding and risk of continued hemorrhage  Rtc 2-3 weeks

## 2024-07-29 ENCOUNTER — OFFICE VISIT (OUTPATIENT)
Dept: OTOLARYNGOLOGY | Facility: CLINIC | Age: 89
End: 2024-07-29
Payer: MEDICARE

## 2024-07-29 DIAGNOSIS — Z79.01 CHRONIC ANTICOAGULATION: ICD-10-CM

## 2024-07-29 DIAGNOSIS — R04.0 RECURRENT EPISTAXIS: Primary | ICD-10-CM

## 2024-07-29 PROCEDURE — 99024 POSTOP FOLLOW-UP VISIT: CPT | Mod: POP,,, | Performed by: OTOLARYNGOLOGY

## 2024-07-29 PROCEDURE — 99213 OFFICE O/P EST LOW 20 MIN: CPT | Mod: PBBFAC,PO | Performed by: OTOLARYNGOLOGY

## 2024-07-29 PROCEDURE — 99999 PR PBB SHADOW E&M-EST. PATIENT-LVL III: CPT | Mod: PBBFAC,,, | Performed by: OTOLARYNGOLOGY

## 2024-07-29 NOTE — PROGRESS NOTES
Fatimah is here for a post-operative visit following nasal cautery and recurrent epistaxis in the setting of chronic anticoag and Oxygen dependence    No issues, doing well      Exam:  Alert, active, appropriate  Purulent crusting in left nasal cavity removed -   No infection suspected  Well healed mucosa      Healing well  Continue moisture   Recommend holding anti-coag indefinitely  Follow-up prn

## 2024-08-02 ENCOUNTER — TELEPHONE (OUTPATIENT)
Dept: CARDIOLOGY | Facility: CLINIC | Age: 89
End: 2024-08-02
Payer: MEDICARE

## 2024-08-02 PROBLEM — I50.42 CHRONIC COMBINED SYSTOLIC AND DIASTOLIC HEART FAILURE: Status: ACTIVE | Noted: 2019-06-13

## 2024-08-02 PROBLEM — Z01.818 PREOP GENERAL PHYSICAL EXAM: Status: ACTIVE | Noted: 2024-08-02

## 2024-08-02 PROBLEM — S72.91XA CLOSED FRACTURE OF RIGHT FEMUR: Status: ACTIVE | Noted: 2024-08-02

## 2024-08-02 NOTE — TELEPHONE ENCOUNTER
Spoke with pts daughter. Daughter states that pt is in ER for a fall. Daughter states that she will have to call back to reschedule appt.

## 2024-08-02 NOTE — TELEPHONE ENCOUNTER
----- Message from Rodney Sun sent at 8/2/2024  1:22 PM CDT -----  Contact: Daughter  Type:  Sooner Appointment Request    Caller is requesting a sooner appointment.  Caller declined first available appointment listed below.  Caller will not accept being placed on the waitlist and is requesting a message be sent to doctor.    Name of Caller:  Daughter    Would the patient rather a call back or a response via [facility]? call  Best Call Back Number:  [phone]  Additional Information:  Please call to reschedule today's appt due to PT falling.  Would possibly like it on a Monday.

## 2024-08-03 PROBLEM — D64.9 ANEMIA: Status: ACTIVE | Noted: 2024-08-03

## 2024-08-04 PROBLEM — Z01.818 PREOP GENERAL PHYSICAL EXAM: Status: RESOLVED | Noted: 2024-08-02 | Resolved: 2024-08-04

## 2024-08-05 PROBLEM — N17.9 AKI (ACUTE KIDNEY INJURY): Status: ACTIVE | Noted: 2024-08-05

## 2024-08-16 DIAGNOSIS — S72.401A CLOSED FRACTURE OF DISTAL END OF RIGHT FEMUR, UNSPECIFIED FRACTURE MORPHOLOGY, INITIAL ENCOUNTER: Primary | ICD-10-CM

## 2024-08-20 ENCOUNTER — OFFICE VISIT (OUTPATIENT)
Dept: ORTHOPEDICS | Facility: CLINIC | Age: 89
End: 2024-08-20
Payer: MEDICARE

## 2024-08-20 ENCOUNTER — HOSPITAL ENCOUNTER (OUTPATIENT)
Dept: RADIOLOGY | Facility: HOSPITAL | Age: 89
Discharge: HOME | End: 2024-08-20
Attending: ORTHOPAEDIC SURGERY
Payer: MEDICARE

## 2024-08-20 VITALS — BODY MASS INDEX: 25.78 KG/M2 | WEIGHT: 151 LBS | HEIGHT: 64 IN

## 2024-08-20 DIAGNOSIS — S72.401A CLOSED FRACTURE OF DISTAL END OF RIGHT FEMUR, UNSPECIFIED FRACTURE MORPHOLOGY, INITIAL ENCOUNTER: ICD-10-CM

## 2024-08-20 DIAGNOSIS — S72.401A CLOSED FRACTURE OF DISTAL END OF RIGHT FEMUR, UNSPECIFIED FRACTURE MORPHOLOGY, INITIAL ENCOUNTER: Primary | ICD-10-CM

## 2024-08-20 PROCEDURE — 99024 POSTOP FOLLOW-UP VISIT: CPT | Mod: POP,,, | Performed by: ORTHOPAEDIC SURGERY

## 2024-08-20 PROCEDURE — 99213 OFFICE O/P EST LOW 20 MIN: CPT | Mod: PBBFAC,PO | Performed by: ORTHOPAEDIC SURGERY

## 2024-08-20 PROCEDURE — 99999 PR PBB SHADOW E&M-EST. PATIENT-LVL III: CPT | Mod: PBBFAC,,, | Performed by: ORTHOPAEDIC SURGERY

## 2024-08-27 NOTE — PROGRESS NOTES
Chief Complaint   Patient presents with    Right Femur - Post-op Evaluation     IM nail R femur retrograde nail 8/3/24; post op with staple removal       HPI:  89 y.o. returns to clinic today status post  right femur IMN 2 weeks ago. Pain is mild. Patient is compliant most of the time with restrictions.     R femur    Incision healed. No erythema or fluctuance. Overall normal alignment. Mild point TTP about the fracture site. Decreased ROM due to stiffness. Compartments soft. Skin intact. NVI distally.      X-rays were performed today, personally reviewed by me and findings discussed with the patient.  3 views of the right femur show hardware intact in good position    Closed fracture of distal end of right femur, unspecified fracture morphology, initial encounter        Sutures out. Continue PT. RTC 6 weeks. She refused xray today as she wants a sunita lift.

## 2025-01-29 NOTE — PROGRESS NOTES
PO nasal hemorrhage control   No bleeding    O2 nasal cannula  Packing suctioned from left nasal cavity  Nasal cavity patent, no bleeding.  Septum is healing    Continue moisture.   Ok to resume Eliquis in 1 week  Rtc 3 weeks     Yes

## 2025-07-06 NOTE — TELEPHONE ENCOUNTER
----- Message from Kelly Hernandez sent at 12/26/2023  1:14 PM CST -----  Pt was seen today and needs a 3 month f/u appt.  [phone](Elisa Campbell-daughter)    
<-- Click to add NO significant Past Surgical History